# Patient Record
Sex: FEMALE | Race: BLACK OR AFRICAN AMERICAN | NOT HISPANIC OR LATINO | Employment: FULL TIME | URBAN - METROPOLITAN AREA
[De-identification: names, ages, dates, MRNs, and addresses within clinical notes are randomized per-mention and may not be internally consistent; named-entity substitution may affect disease eponyms.]

---

## 2018-03-12 ENCOUNTER — HOSPITAL ENCOUNTER (EMERGENCY)
Facility: OTHER | Age: 43
Discharge: HOME | End: 2018-03-12
Attending: EMERGENCY MEDICINE
Payer: COMMERCIAL

## 2018-03-12 VITALS
HEART RATE: 77 BPM | TEMPERATURE: 98 F | SYSTOLIC BLOOD PRESSURE: 139 MMHG | DIASTOLIC BLOOD PRESSURE: 73 MMHG | WEIGHT: 233 LBS | HEIGHT: 63 IN | RESPIRATION RATE: 16 BRPM | BODY MASS INDEX: 41.29 KG/M2 | OXYGEN SATURATION: 100 %

## 2018-03-12 DIAGNOSIS — T22.211A SECOND DEGREE BURN OF FOREARM, RIGHT, INITIAL ENCOUNTER: Primary | ICD-10-CM

## 2018-03-12 DIAGNOSIS — L03.113 CELLULITIS OF FOREARM, RIGHT: ICD-10-CM

## 2018-03-12 LAB
B-HCG UR QL: NEGATIVE
CTP QC/QA: YES

## 2018-03-12 PROCEDURE — 25000003 PHARM REV CODE 250: Performed by: EMERGENCY MEDICINE

## 2018-03-12 PROCEDURE — 63600175 PHARM REV CODE 636 W HCPCS: Performed by: EMERGENCY MEDICINE

## 2018-03-12 PROCEDURE — 90715 TDAP VACCINE 7 YRS/> IM: CPT | Performed by: EMERGENCY MEDICINE

## 2018-03-12 PROCEDURE — 99283 EMERGENCY DEPT VISIT LOW MDM: CPT | Mod: 25

## 2018-03-12 PROCEDURE — 90471 IMMUNIZATION ADMIN: CPT | Performed by: EMERGENCY MEDICINE

## 2018-03-12 PROCEDURE — 81025 URINE PREGNANCY TEST: CPT | Performed by: PHYSICIAN ASSISTANT

## 2018-03-12 RX ORDER — TRAMADOL HYDROCHLORIDE 50 MG/1
50 TABLET ORAL EVERY 6 HOURS PRN
Qty: 10 TABLET | Refills: 0 | Status: SHIPPED | OUTPATIENT
Start: 2018-03-12 | End: 2018-03-22

## 2018-03-12 RX ORDER — METOPROLOL TARTRATE 25 MG/1
0.5 TABLET, FILM COATED ORAL DAILY
COMMUNITY
End: 2018-06-01

## 2018-03-12 RX ORDER — SULFAMETHOXAZOLE AND TRIMETHOPRIM 800; 160 MG/1; MG/1
2 TABLET ORAL
Status: COMPLETED | OUTPATIENT
Start: 2018-03-12 | End: 2018-03-12

## 2018-03-12 RX ORDER — SULFAMETHOXAZOLE AND TRIMETHOPRIM 800; 160 MG/1; MG/1
2 TABLET ORAL 2 TIMES DAILY
Qty: 28 TABLET | Refills: 0 | Status: SHIPPED | OUTPATIENT
Start: 2018-03-12 | End: 2018-03-19

## 2018-03-12 RX ORDER — SILVER SULFADIAZINE 10 G/1000G
CREAM TOPICAL
Status: COMPLETED | OUTPATIENT
Start: 2018-03-12 | End: 2018-03-12

## 2018-03-12 RX ADMIN — CLOSTRIDIUM TETANI TOXOID ANTIGEN (FORMALDEHYDE INACTIVATED), CORYNEBACTERIUM DIPHTHERIAE TOXOID ANTIGEN (FORMALDEHYDE INACTIVATED), BORDETELLA PERTUSSIS TOXOID ANTIGEN (GLUTARALDEHYDE INACTIVATED), BORDETELLA PERTUSSIS FILAMENTOUS HEMAGGLUTININ ANTIGEN (FORMALDEHYDE INACTIVATED), BORDETELLA PERTUSSIS PERTACTIN ANTIGEN, AND BORDETELLA PERTUSSIS FIMBRIAE 2/3 ANTIGEN 0.5 ML: 5; 2; 2.5; 5; 3; 5 INJECTION, SUSPENSION INTRAMUSCULAR at 03:03

## 2018-03-12 RX ADMIN — SILVER SULFADIAZINE: 10 CREAM TOPICAL at 03:03

## 2018-03-12 RX ADMIN — SULFAMETHOXAZOLE AND TRIMETHOPRIM 2 TABLET: 800; 160 TABLET ORAL at 03:03
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
## 2018-06-01 ENCOUNTER — OFFICE VISIT (OUTPATIENT)
Dept: INTERNAL MEDICINE | Facility: CLINIC | Age: 43
End: 2018-06-01
Payer: COMMERCIAL

## 2018-06-01 ENCOUNTER — LAB VISIT (OUTPATIENT)
Dept: LAB | Facility: HOSPITAL | Age: 43
End: 2018-06-01
Attending: INTERNAL MEDICINE
Payer: COMMERCIAL

## 2018-06-01 VITALS
WEIGHT: 233.69 LBS | RESPIRATION RATE: 19 BRPM | TEMPERATURE: 98 F | HEART RATE: 70 BPM | SYSTOLIC BLOOD PRESSURE: 120 MMHG | DIASTOLIC BLOOD PRESSURE: 80 MMHG | HEIGHT: 63 IN | BODY MASS INDEX: 41.41 KG/M2

## 2018-06-01 DIAGNOSIS — Z00.00 ANNUAL PHYSICAL EXAM: ICD-10-CM

## 2018-06-01 DIAGNOSIS — R53.83 FATIGUE, UNSPECIFIED TYPE: ICD-10-CM

## 2018-06-01 DIAGNOSIS — D50.0 IRON DEFICIENCY ANEMIA DUE TO CHRONIC BLOOD LOSS: ICD-10-CM

## 2018-06-01 DIAGNOSIS — F32.A DEPRESSION, UNSPECIFIED DEPRESSION TYPE: ICD-10-CM

## 2018-06-01 DIAGNOSIS — Z00.00 ANNUAL PHYSICAL EXAM: Primary | ICD-10-CM

## 2018-06-01 LAB
25(OH)D3+25(OH)D2 SERPL-MCNC: 9 NG/ML
ALBUMIN SERPL BCP-MCNC: 3.7 G/DL
ALP SERPL-CCNC: 39 U/L
ALT SERPL W/O P-5'-P-CCNC: 15 U/L
ANION GAP SERPL CALC-SCNC: 6 MMOL/L
ANISOCYTOSIS BLD QL SMEAR: SLIGHT
AST SERPL-CCNC: 16 U/L
BASOPHILS # BLD AUTO: 0.01 K/UL
BASOPHILS NFR BLD: 0.2 %
BILIRUB SERPL-MCNC: 1.9 MG/DL
BUN SERPL-MCNC: 15 MG/DL
CALCIUM SERPL-MCNC: 8.9 MG/DL
CHLORIDE SERPL-SCNC: 112 MMOL/L
CHOLEST SERPL-MCNC: 143 MG/DL
CHOLEST/HDLC SERPL: 2.4 {RATIO}
CO2 SERPL-SCNC: 25 MMOL/L
CREAT SERPL-MCNC: 0.7 MG/DL
DACRYOCYTES BLD QL SMEAR: ABNORMAL
DIFFERENTIAL METHOD: ABNORMAL
EOSINOPHIL # BLD AUTO: 0.1 K/UL
EOSINOPHIL NFR BLD: 1.3 %
ERYTHROCYTE [DISTWIDTH] IN BLOOD BY AUTOMATED COUNT: 17.9 %
EST. GFR  (AFRICAN AMERICAN): >60 ML/MIN/1.73 M^2
EST. GFR  (NON AFRICAN AMERICAN): >60 ML/MIN/1.73 M^2
ESTIMATED AVG GLUCOSE: ABNORMAL MG/DL
FERRITIN SERPL-MCNC: 24 NG/ML
FOLATE SERPL-MCNC: 7.8 NG/ML
GIANT PLATELETS BLD QL SMEAR: PRESENT
GLUCOSE SERPL-MCNC: 93 MG/DL
HBA1C MFR BLD HPLC: <4 %
HCT VFR BLD AUTO: 30.3 %
HDLC SERPL-MCNC: 60 MG/DL
HDLC SERPL: 42 %
HGB BLD-MCNC: 9.8 G/DL
HYPOCHROMIA BLD QL SMEAR: ABNORMAL
IMM GRANULOCYTES # BLD AUTO: 0.03 K/UL
IMM GRANULOCYTES NFR BLD AUTO: 0.7 %
IRON SERPL-MCNC: 60 UG/DL
LDLC SERPL CALC-MCNC: 74.4 MG/DL
LYMPHOCYTES # BLD AUTO: 0.9 K/UL
LYMPHOCYTES NFR BLD: 20.8 %
MCH RBC QN AUTO: 25.1 PG
MCHC RBC AUTO-ENTMCNC: 32.3 G/DL
MCV RBC AUTO: 78 FL
MONOCYTES # BLD AUTO: 0.3 K/UL
MONOCYTES NFR BLD: 6.9 %
NEUTROPHILS # BLD AUTO: 3.2 K/UL
NEUTROPHILS NFR BLD: 70.1 %
NONHDLC SERPL-MCNC: 83 MG/DL
NRBC BLD-RTO: 0 /100 WBC
OVALOCYTES BLD QL SMEAR: ABNORMAL
PLATELET # BLD AUTO: 186 K/UL
PLATELET BLD QL SMEAR: ABNORMAL
PMV BLD AUTO: ABNORMAL FL
POIKILOCYTOSIS BLD QL SMEAR: SLIGHT
POLYCHROMASIA BLD QL SMEAR: ABNORMAL
POTASSIUM SERPL-SCNC: 3.9 MMOL/L
PROT SERPL-MCNC: 7.1 G/DL
RBC # BLD AUTO: 3.91 M/UL
SATURATED IRON: 15 %
SCHISTOCYTES BLD QL SMEAR: ABNORMAL
SCHISTOCYTES BLD QL SMEAR: PRESENT
SODIUM SERPL-SCNC: 143 MMOL/L
T4 FREE SERPL-MCNC: 0.94 NG/DL
TARGETS BLD QL SMEAR: ABNORMAL
TOTAL IRON BINDING CAPACITY: 404 UG/DL
TRANSFERRIN SERPL-MCNC: 273 MG/DL
TRIGL SERPL-MCNC: 43 MG/DL
TSH SERPL DL<=0.005 MIU/L-ACNC: 0.32 UIU/ML
VIT B12 SERPL-MCNC: 599 PG/ML
WBC # BLD AUTO: 4.52 K/UL

## 2018-06-01 PROCEDURE — 80053 COMPREHEN METABOLIC PANEL: CPT

## 2018-06-01 PROCEDURE — 82306 VITAMIN D 25 HYDROXY: CPT

## 2018-06-01 PROCEDURE — 84443 ASSAY THYROID STIM HORMONE: CPT

## 2018-06-01 PROCEDURE — 83540 ASSAY OF IRON: CPT

## 2018-06-01 PROCEDURE — 99386 PREV VISIT NEW AGE 40-64: CPT | Mod: S$GLB,,, | Performed by: INTERNAL MEDICINE

## 2018-06-01 PROCEDURE — 83036 HEMOGLOBIN GLYCOSYLATED A1C: CPT

## 2018-06-01 PROCEDURE — 36415 COLL VENOUS BLD VENIPUNCTURE: CPT | Mod: PO

## 2018-06-01 PROCEDURE — 85025 COMPLETE CBC W/AUTO DIFF WBC: CPT

## 2018-06-01 PROCEDURE — 99999 PR PBB SHADOW E&M-EST. PATIENT-LVL IV: CPT | Mod: PBBFAC,,, | Performed by: INTERNAL MEDICINE

## 2018-06-01 PROCEDURE — 82728 ASSAY OF FERRITIN: CPT

## 2018-06-01 PROCEDURE — 82607 VITAMIN B-12: CPT

## 2018-06-01 PROCEDURE — 82746 ASSAY OF FOLIC ACID SERUM: CPT

## 2018-06-01 PROCEDURE — 84439 ASSAY OF FREE THYROXINE: CPT

## 2018-06-01 PROCEDURE — 80061 LIPID PANEL: CPT

## 2018-06-01 RX ORDER — BUPROPION HYDROCHLORIDE 150 MG/1
150 TABLET ORAL DAILY
Qty: 30 TABLET | Refills: 11 | Status: SHIPPED | OUTPATIENT
Start: 2018-06-01 | End: 2018-08-14 | Stop reason: DRUGHIGH
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
## 2018-06-01 NOTE — PATIENT INSTRUCTIONS
Given the sensitive nature of Psychiatry visits, you will need to call to schedule your own appointment as our referral coordinator can not schedule this for you. The number for that department is [phone].

## 2018-06-04 ENCOUNTER — PATIENT MESSAGE (OUTPATIENT)
Dept: INTERNAL MEDICINE | Facility: CLINIC | Age: 43
End: 2018-06-04

## 2018-06-04 DIAGNOSIS — E05.90 SUBCLINICAL HYPERTHYROIDISM: ICD-10-CM

## 2018-06-04 DIAGNOSIS — F33.0 MILD EPISODE OF RECURRENT MAJOR DEPRESSIVE DISORDER: ICD-10-CM

## 2018-06-04 DIAGNOSIS — D50.9 IRON DEFICIENCY ANEMIA, UNSPECIFIED IRON DEFICIENCY ANEMIA TYPE: ICD-10-CM

## 2018-06-04 DIAGNOSIS — R17 SERUM TOTAL BILIRUBIN ELEVATED: ICD-10-CM

## 2018-06-04 DIAGNOSIS — E55.9 VITAMIN D DEFICIENCY: ICD-10-CM

## 2018-06-04 RX ORDER — ERGOCALCIFEROL 1.25 MG/1
50000 CAPSULE ORAL
Qty: 12 CAPSULE | Refills: 0 | Status: SHIPPED | OUTPATIENT
Start: 2018-06-04 | End: 2018-08-21

## 2018-07-13 ENCOUNTER — OFFICE VISIT (OUTPATIENT)
Dept: OBSTETRICS AND GYNECOLOGY | Facility: CLINIC | Age: 43
End: 2018-07-13
Payer: COMMERCIAL

## 2018-07-13 VITALS
HEIGHT: 63 IN | DIASTOLIC BLOOD PRESSURE: 80 MMHG | WEIGHT: 237.19 LBS | SYSTOLIC BLOOD PRESSURE: 120 MMHG | BODY MASS INDEX: 42.03 KG/M2

## 2018-07-13 DIAGNOSIS — N93.9 ABNORMAL UTERINE BLEEDING: ICD-10-CM

## 2018-07-13 DIAGNOSIS — Z01.419 WELL FEMALE EXAM WITH ROUTINE GYNECOLOGICAL EXAM: Primary | ICD-10-CM

## 2018-07-13 DIAGNOSIS — Z12.39 BREAST CANCER SCREENING: ICD-10-CM

## 2018-07-13 PROCEDURE — 99999 PR PBB SHADOW E&M-EST. PATIENT-LVL III: CPT | Mod: PBBFAC,,, | Performed by: OBSTETRICS & GYNECOLOGY

## 2018-07-13 PROCEDURE — 99386 PREV VISIT NEW AGE 40-64: CPT | Mod: S$GLB,,, | Performed by: OBSTETRICS & GYNECOLOGY

## 2018-07-13 PROCEDURE — 88175 CYTOPATH C/V AUTO FLUID REDO: CPT

## 2018-07-13 RX ORDER — ACETAMINOPHEN AND CODEINE PHOSPHATE 300; 30 MG/1; MG/1
1 TABLET ORAL
Status: ON HOLD | COMMUNITY
End: 2018-09-10 | Stop reason: HOSPADM

## 2018-07-13 RX ORDER — CYCLOBENZAPRINE HCL 10 MG
10 TABLET ORAL 3 TIMES DAILY PRN
COMMUNITY
End: 2021-07-19

## 2018-07-13 RX ORDER — TRAMADOL HYDROCHLORIDE 100 MG/1
100 TABLET, EXTENDED RELEASE ORAL DAILY
COMMUNITY
End: 2021-07-19

## 2018-07-13 NOTE — LETTER
July 13, 2018      Janneth Tavera MD  [address] [facility] [address]           [facility] - OB/ GYN  [address] [facility] [address]  Phone: [phone]          Patient: Rakel Mims   MR Number: [id number]   YOB: 1975   Date of Visit: 7/13/2018       Dear Dr. Janneth Tavera:    Thank you for referring Rakel Mims to me for evaluation. Attached you will find relevant portions of my assessment and plan of care.    If you have questions, please do not hesitate to call me. I look forward to following Rakel Mims along with you.    Sincerely,    Kate Butterfield MD    Enclosure  CC:  No Recipients    If you would like to receive this communication electronically, please contact [email or web address] or [phone] to request more information on [employer] access.    For providers and/or their staff who would like to refer a patient to [facility], please contact us through our one-stop-shop provider referral line, Rosalino Garcia, at [phone].    If you feel you have received this communication in error or would no longer like to receive these types of communications, please e-mail [email or web address]

## 2018-07-13 NOTE — PROGRESS NOTES
SUBJECTIVE:   43 y.o. female   for routine gyn exam. Patient's last menstrual period was 2018..  She reports heavy periods.         Past Medical History:   Diagnosis Date    Bipolar 1 disorder     Hypertension      Past Surgical History:   Procedure Laterality Date    CHOLECYSTECTOMY      TUBAL LIGATION       Social History     Social History    Marital status:      Spouse name: N/A    Number of children: N/A    Years of education: N/A     Occupational History    Not on file.     Social History Main Topics    Smoking status: Never Smoker    Smokeless tobacco: Never Used    Alcohol use Yes      Comment: occasionally    Drug use: No    Sexual activity: Not Currently     Birth control/ protection: See Surgical Hx      Comment: Bilateral Tubal Ligation     Other Topics Concern    Not on file     Social History Narrative    No narrative on file     Family History   Problem Relation Age of Onset    Hypertension Mother     Heart failure Father     Hypertension Father     Ulcers Father     Hypertension Sister     ADD / ADHD Daughter     ADD / ADHD Son     Breast cancer Neg Hx     Colon cancer Neg Hx     Ovarian cancer Neg Hx      OB History    Para Term  AB Living   5 2       2   SAB TAB Ectopic Multiple Live Births           2      # Outcome Date GA Lbr César/2nd Weight Sex Delivery Anes PTL Lv   5             4             3             2 Para            1 Para                     Current Outpatient Prescriptions   Medication Sig Dispense Refill    acetaminophen-codeine 300-30mg (TYLENOL #3) 300-30 mg Tab Take by mouth.      cyclobenzaprine (FLEXERIL) 10 MG tablet Take 10 mg by mouth 3 (three) times daily as needed for Muscle spasms.      traMADol (ULTRAM-ER) 100 MG Tb24 Take 100 mg by mouth once daily.      buPROPion (WELLBUTRIN XL) 150 MG TB24 tablet Take 1 tablet (150 mg total) by mouth once daily. 30 tablet 11    ergocalciferol  "(ERGOCALCIFEROL) 50,000 unit Cap Take 1 capsule (50,000 Units total) by mouth every 7 days. 12 capsule 0     No current facility-administered medications for this visit.      Allergies: Reglan [metoclopramide hcl]     ROS:  Constitutional: no weight loss, weight gain, fever, fatigue  Eyes:  No vision changes, glasses/contacts  ENT/Mouth: No ulcers, sinus problems, ears ringing, headache  Cardiovascular: No inability to lie flat, chest pain, exercise intolerance, swelling, heart palpitations  Respiratory: No wheezing, coughing blood, shortness of breath, or cough  Gastrointestinal: No diarrhea, bloody stool, nausea/vomiting, constipation, gas, hemorrhoids  Genitourinary: No blood in urine, painful urination, urgency of urination, frequency of urination, incomplete emptying, incontinence, abnormal bleeding, painful periods, +heavy periods, vaginal discharge, vaginal odor, painful intercourse, sexual problems, bleeding after intercourse.  Musculoskeletal: No muscle weakness  Skin/Breast: No painful breasts, nipple discharge, masses, rash, ulcers  Neurological: No passing out, seizures, numbness, headache  Endocrine: No diabetes, hypothyroid, hyperthyroid, hot flashes, hair loss, abnormal hair growth, ance  Psychiatric: No depression, crying  Hematologic: No bruises, bleeding, swollen lymph nodes, anemia.      OBJECTIVE:   The patient appears well, alert, oriented x 3, in no distress.  /80 (BP Location: Left arm, Patient Position: Sitting, BP Method: Large (Manual))   Ht 5' 3" (1.6 m)   Wt 107.6 kg (237 lb 3.4 oz)   LMP 06/30/2018   BMI 42.02 kg/m²   NECK: no thyromegaly, trachea midline  SKIN: no acne, striae, hirsutism  CHEST: CTAB  CV: RRR  BREAST EXAM: breasts appear normal, no suspicious masses, no skin or nipple changes or axillary nodes  ABDOMEN: no hernias, masses, or hepatosplenomegaly  GENITALIA: normal external genitalia, no erythema, no discharge  URETHRA: normal urethra, normal urethral " meatus  VAGINA: Normal  CERVIX: no lesions or cervical motion tenderness  UTERUS: normal size, contour, position, consistency, mobility, non-tender  ADNEXA: no mass, fullness, tenderness      ASSESSMENT:   1. Well female exam with routine gynecological exam  Liquid-based pap smear, screening   2. Breast cancer screening  Mammo Digital Screening Bilat with CAD   3. Abnormal uterine bleeding  US Pelvis Comp with Transvag NON-OB (xpd       PLAN:   Orders Placed This Encounter    Mammo Digital Screening Bilat with CAD    US Pelvis Comp with Transvag NON-OB (xpd    Liquid-based pap smear, screening     Discussed heavy periods.  Plan u/s and appt after to discuss  Return to clinic in 1 year

## 2018-07-25 ENCOUNTER — HOSPITAL ENCOUNTER (OUTPATIENT)
Dept: RADIOLOGY | Facility: OTHER | Age: 43
Discharge: HOME | End: 2018-07-25
Attending: OBSTETRICS & GYNECOLOGY
Payer: COMMERCIAL

## 2018-07-25 DIAGNOSIS — N93.9 ABNORMAL UTERINE BLEEDING: ICD-10-CM

## 2018-07-25 PROCEDURE — 76856 US EXAM PELVIC COMPLETE: CPT | Mod: TC

## 2018-07-25 PROCEDURE — 76830 TRANSVAGINAL US NON-OB: CPT | Mod: 26,,, | Performed by: RADIOLOGY

## 2018-07-25 PROCEDURE — 76856 US EXAM PELVIC COMPLETE: CPT | Mod: 26,,, | Performed by: RADIOLOGY

## 2018-07-26 ENCOUNTER — TELEPHONE (OUTPATIENT)
Dept: OBSTETRICS AND GYNECOLOGY | Facility: CLINIC | Age: 43
End: 2018-07-26

## 2018-07-26 ENCOUNTER — PATIENT MESSAGE (OUTPATIENT)
Dept: OBSTETRICS AND GYNECOLOGY | Facility: CLINIC | Age: 43
End: 2018-07-26

## 2018-07-26 NOTE — TELEPHONE ENCOUNTER
----- Message from Uzma Ha sent at 7/26/2018 11:52 AM CDT -----  Contact: REMBERTO SANTORO [[id number]]            Name of Who is Calling: REMBERTO SANTORO [[id number]]      What is the request in detail: Returning a call.    Can the clinic reply by [facility]: no    [facility] [phone]

## 2018-08-13 ENCOUNTER — OFFICE VISIT (OUTPATIENT)
Dept: OBSTETRICS AND GYNECOLOGY | Facility: CLINIC | Age: 43
End: 2018-08-13
Attending: OBSTETRICS & GYNECOLOGY
Payer: COMMERCIAL

## 2018-08-13 ENCOUNTER — TELEPHONE (OUTPATIENT)
Dept: OBSTETRICS AND GYNECOLOGY | Facility: CLINIC | Age: 43
End: 2018-08-13

## 2018-08-13 VITALS
BODY MASS INDEX: 41.68 KG/M2 | SYSTOLIC BLOOD PRESSURE: 128 MMHG | WEIGHT: 235.25 LBS | DIASTOLIC BLOOD PRESSURE: 74 MMHG | HEIGHT: 63 IN

## 2018-08-13 DIAGNOSIS — N93.9 ABNORMAL UTERINE BLEEDING: ICD-10-CM

## 2018-08-13 DIAGNOSIS — N93.9 ABNORMAL UTERINE BLEEDING: Primary | ICD-10-CM

## 2018-08-13 DIAGNOSIS — R93.89 THICKENED ENDOMETRIUM: Primary | ICD-10-CM

## 2018-08-13 DIAGNOSIS — R93.5 ABNORMAL ULTRASOUND OF ENDOMETRIUM: ICD-10-CM

## 2018-08-13 PROCEDURE — 99214 OFFICE O/P EST MOD 30 MIN: CPT | Mod: 57,S$GLB,, | Performed by: OBSTETRICS & GYNECOLOGY

## 2018-08-13 PROCEDURE — 3008F BODY MASS INDEX DOCD: CPT | Mod: CPTII,S$GLB,, | Performed by: OBSTETRICS & GYNECOLOGY

## 2018-08-13 PROCEDURE — 99999 PR PBB SHADOW E&M-EST. PATIENT-LVL III: CPT | Mod: PBBFAC,,, | Performed by: OBSTETRICS & GYNECOLOGY

## 2018-08-13 NOTE — PROGRESS NOTES
SUBJECTIVE:   43 y.o. female   for follow up of AUB. Patient's last menstrual period was 2018 (exact date)..  Seen 18 for [employer].  Reported heavy periods that occur at normal intervals.   H/o BTL.  U/s showed em stripe of 39 mm.  Has anemia.         Past Medical History:   Diagnosis Date    Bipolar 1 disorder     Hypertension      Past Surgical History:   Procedure Laterality Date    CHOLECYSTECTOMY      TUBAL LIGATION       Social History     Socioeconomic History    Marital status:      Spouse name: Not on file    Number of children: Not on file    Years of education: Not on file    Highest education level: Not on file   Social Needs    Financial resource strain: Not on file    Food insecurity - worry: Not on file    Food insecurity - inability: Not on file    Transportation needs - medical: Not on file    Transportation needs - non-medical: Not on file   Occupational History    Not on file   Tobacco Use    Smoking status: Never Smoker    Smokeless tobacco: Never Used   Substance and Sexual Activity    Alcohol use: Yes     Comment: occasionally    Drug use: No    Sexual activity: Not Currently     Birth control/protection: See Surgical Hx     Comment: Bilateral Tubal Ligation   Other Topics Concern    Not on file   Social History Narrative    Not on file     Family History   Problem Relation Age of Onset    Hypertension Mother     Heart failure Father     Hypertension Father     Ulcers Father     Hypertension Sister     ADD / ADHD Daughter     ADD / ADHD Son     Breast cancer Neg Hx     Colon cancer Neg Hx     Ovarian cancer Neg Hx      OB History    Para Term  AB Living   5 2       2   SAB TAB Ectopic Multiple Live Births           2      # Outcome Date GA Lbr César/2nd Weight Sex Delivery Anes PTL Lv   5             4             3             2 Para            1 Para                     Current Outpatient Medications   Medication  "Sig Dispense Refill    acetaminophen-codeine 300-30mg (TYLENOL #3) 300-30 mg Tab Take by mouth.      buPROPion (WELLBUTRIN XL) 150 MG TB24 tablet Take 1 tablet (150 mg total) by mouth once daily. 30 tablet 11    cyclobenzaprine (FLEXERIL) 10 MG tablet Take 10 mg by mouth 3 (three) times daily as needed for Muscle spasms.      ergocalciferol (ERGOCALCIFEROL) 50,000 unit Cap Take 1 capsule (50,000 Units total) by mouth every 7 days. 12 capsule 0    traMADol (ULTRAM-ER) 100 MG Tb24 Take 100 mg by mouth once daily.       No current facility-administered medications for this visit.      Allergies: Reglan [metoclopramide hcl]     ROS:  Constitutional: no weight loss, weight gain, fever, fatigue  Eyes:  No vision changes, glasses/contacts  ENT/Mouth: No ulcers, sinus problems, ears ringing, headache  Cardiovascular: No inability to lie flat, chest pain, exercise intolerance, swelling, heart palpitations  Respiratory: No wheezing, coughing blood, shortness of breath, or cough  Gastrointestinal: No diarrhea, bloody stool, nausea/vomiting, constipation, gas, hemorrhoids  Genitourinary: No blood in urine, painful urination, urgency of urination, frequency of urination, incomplete emptying, incontinence, +abnormal bleeding, painful periods, +heavy periods, vaginal discharge, vaginal odor, painful intercourse, sexual problems, bleeding after intercourse.  Musculoskeletal: No muscle weakness  Skin/Breast: No painful breasts, nipple discharge, masses, rash, ulcers  Neurological: No passing out, seizures, numbness, headache  Endocrine: No diabetes, hypothyroid, hyperthyroid, hot flashes, hair loss, abnormal hair growth, ance  Psychiatric: No depression, crying  Hematologic: No bruises, bleeding, swollen lymph nodes, anemia.      OBJECTIVE:   The patient appears well, alert, oriented x 3, in no distress.  /74 (BP Location: Left arm, Patient Position: Sitting)   Ht 5' 3" (1.6 m)   Wt 106.7 kg (235 lb 3.7 oz)   LMP " 08/02/2018 (Exact Date)   BMI 41.67 kg/m²       ASSESSMENT:   1. Abnormal uterine bleeding     2. Abnormal ultrasound of endometrium         PLAN:       Discussed AUB, causes, u/s, abnl em stripe, and need for bx.  Discussed office Embx vs D&C hysteroscopy and risks/ benefits of each.  Desires D&C hysteroscopy in OR.  Considering hysterectomy to manage AUB.  Return to clinic for pre-op

## 2018-08-14 DIAGNOSIS — F33.0 MILD EPISODE OF RECURRENT MAJOR DEPRESSIVE DISORDER: Primary | ICD-10-CM

## 2018-08-14 DIAGNOSIS — F32.A DEPRESSION, UNSPECIFIED DEPRESSION TYPE: ICD-10-CM

## 2018-08-14 RX ORDER — BUPROPION HYDROCHLORIDE 300 MG/1
300 TABLET ORAL DAILY
Qty: 30 TABLET | Refills: 11 | Status: SHIPPED | OUTPATIENT
Start: 2018-08-14 | End: 2018-10-22

## 2018-08-14 RX ORDER — BUPROPION HYDROCHLORIDE 150 MG/1
150 TABLET ORAL DAILY
Qty: 30 TABLET | Refills: 11 | Status: CANCELLED | OUTPATIENT
Start: 2018-08-14 | End: 2019-08-14

## 2018-08-14 NOTE — TELEPHONE ENCOUNTER
----- Message from Hermelinda Laguerre sent at 8/14/2018  2:11 PM CDT -----  Contact: self/[phone]  Type: Rx    Name of medication(s): buPROPion (WELLBUTRIN XL) 150 MG TB24 tablet    Is this a refill? New rx? Refill     Who prescribed medication?      [facility] [address] [place] [address]    Comments: Pt also states that she wants to know if the dosage bumped up. Please advise.

## 2018-09-05 ENCOUNTER — TELEPHONE (OUTPATIENT)
Dept: OBSTETRICS AND GYNECOLOGY | Facility: CLINIC | Age: 43
End: 2018-09-05

## 2018-09-06 ENCOUNTER — OFFICE VISIT (OUTPATIENT)
Dept: OBSTETRICS AND GYNECOLOGY | Facility: CLINIC | Age: 43
End: 2018-09-06
Attending: OBSTETRICS & GYNECOLOGY
Payer: COMMERCIAL

## 2018-09-06 ENCOUNTER — HOSPITAL ENCOUNTER (OUTPATIENT)
Dept: PREADMISSION TESTING | Facility: OTHER | Age: 43
Discharge: HOME | End: 2018-09-06
Attending: OBSTETRICS & GYNECOLOGY
Payer: COMMERCIAL

## 2018-09-06 ENCOUNTER — ANESTHESIA EVENT (OUTPATIENT)
Dept: SURGERY | Facility: OTHER | Age: 43
End: 2018-09-06
Payer: COMMERCIAL

## 2018-09-06 VITALS
DIASTOLIC BLOOD PRESSURE: 70 MMHG | BODY MASS INDEX: 41.95 KG/M2 | WEIGHT: 236.75 LBS | HEIGHT: 63 IN | SYSTOLIC BLOOD PRESSURE: 128 MMHG

## 2018-09-06 VITALS
DIASTOLIC BLOOD PRESSURE: 87 MMHG | OXYGEN SATURATION: 99 % | BODY MASS INDEX: 41.64 KG/M2 | WEIGHT: 235 LBS | HEIGHT: 63 IN | SYSTOLIC BLOOD PRESSURE: 145 MMHG | TEMPERATURE: 98 F | HEART RATE: 65 BPM

## 2018-09-06 DIAGNOSIS — R93.5 ABNORMAL ULTRASOUND OF ENDOMETRIUM: ICD-10-CM

## 2018-09-06 DIAGNOSIS — D50.0 IRON DEFICIENCY ANEMIA DUE TO CHRONIC BLOOD LOSS: ICD-10-CM

## 2018-09-06 DIAGNOSIS — N93.9 ABNORMAL UTERINE BLEEDING: ICD-10-CM

## 2018-09-06 DIAGNOSIS — Z01.818 PREOPERATIVE EXAM FOR GYNECOLOGIC SURGERY: Primary | ICD-10-CM

## 2018-09-06 LAB
ABO + RH BLD: NORMAL
ANISOCYTOSIS BLD QL SMEAR: SLIGHT
BASOPHILS # BLD AUTO: 0.01 K/UL
BASOPHILS NFR BLD: 0.2 %
BLD GP AB SCN CELLS X3 SERPL QL: NORMAL
DIFFERENTIAL METHOD: ABNORMAL
EOSINOPHIL # BLD AUTO: 0.1 K/UL
EOSINOPHIL NFR BLD: 1.4 %
ERYTHROCYTE [DISTWIDTH] IN BLOOD BY AUTOMATED COUNT: 17.9 %
HCT VFR BLD AUTO: 31.8 %
HGB BLD-MCNC: 10.9 G/DL
HYPOCHROMIA BLD QL SMEAR: ABNORMAL
LYMPHOCYTES # BLD AUTO: 1.3 K/UL
LYMPHOCYTES NFR BLD: 25 %
MCH RBC QN AUTO: 25.6 PG
MCHC RBC AUTO-ENTMCNC: 34.3 G/DL
MCV RBC AUTO: 75 FL
MONOCYTES # BLD AUTO: 0.3 K/UL
MONOCYTES NFR BLD: 5.9 %
NEUTROPHILS # BLD AUTO: 3.4 K/UL
NEUTROPHILS NFR BLD: 67.5 %
OVALOCYTES BLD QL SMEAR: ABNORMAL
PLATELET # BLD AUTO: 127 K/UL
PLATELET BLD QL SMEAR: ABNORMAL
PMV BLD AUTO: ABNORMAL FL
POIKILOCYTOSIS BLD QL SMEAR: SLIGHT
POLYCHROMASIA BLD QL SMEAR: ABNORMAL
RBC # BLD AUTO: 4.25 M/UL
SCHISTOCYTES BLD QL SMEAR: ABNORMAL
SCHISTOCYTES BLD QL SMEAR: PRESENT
SPHEROCYTES BLD QL SMEAR: ABNORMAL
WBC # BLD AUTO: 5.07 K/UL

## 2018-09-06 PROCEDURE — 87660 TRICHOMONAS VAGIN DIR PROBE: CPT

## 2018-09-06 PROCEDURE — 86850 RBC ANTIBODY SCREEN: CPT

## 2018-09-06 PROCEDURE — 85025 COMPLETE CBC W/AUTO DIFF WBC: CPT

## 2018-09-06 PROCEDURE — 99499 UNLISTED E&M SERVICE: CPT | Mod: S$GLB,,, | Performed by: OBSTETRICS & GYNECOLOGY

## 2018-09-06 PROCEDURE — 36415 COLL VENOUS BLD VENIPUNCTURE: CPT

## 2018-09-06 PROCEDURE — 99999 PR PBB SHADOW E&M-EST. PATIENT-LVL III: CPT | Mod: PBBFAC,,, | Performed by: OBSTETRICS & GYNECOLOGY

## 2018-09-06 RX ORDER — SODIUM CHLORIDE, SODIUM LACTATE, POTASSIUM CHLORIDE, CALCIUM CHLORIDE 600; 310; 30; 20 MG/100ML; MG/100ML; MG/100ML; MG/100ML
INJECTION, SOLUTION INTRAVENOUS CONTINUOUS
Status: CANCELLED | OUTPATIENT
Start: 2018-09-06

## 2018-09-06 RX ORDER — NAPROXEN 500 MG/1
500 TABLET ORAL
COMMUNITY
End: 2021-07-19

## 2018-09-06 NOTE — PROGRESS NOTES
SUBJECTIVE:   43 y.o. female   For preoperative gyn exam. Patient's last menstrual period was 2018 (exact date)..  Seen 18 for WWE.  Reported heavy periods lasting 5 days that occur at normal intervals.   H/o BTL.  U/s showed em stripe of 39 mm.  Has anemia.  Desires further evaluation and treatment with hysteroscopy D&C.  She has no unusual complaints          Past Medical History:   Diagnosis Date    Bipolar 1 disorder     Hypertension     Iron deficiency anemia 2018     Past Surgical History:   Procedure Laterality Date    CHOLECYSTECTOMY      TUBAL LIGATION       Social History     Socioeconomic History    Marital status:      Spouse name: Not on file    Number of children: Not on file    Years of education: Not on file    Highest education level: Not on file   Social Needs    Financial resource strain: Not on file    Food insecurity - worry: Not on file    Food insecurity - inability: Not on file    Transportation needs - medical: Not on file    Transportation needs - non-medical: Not on file   Occupational History    Not on file   Tobacco Use    Smoking status: Never Smoker    Smokeless tobacco: Never Used   Substance and Sexual Activity    Alcohol use: Yes     Comment: occasionally    Drug use: No    Sexual activity: Not Currently     Birth control/protection: See Surgical Hx     Comment: Bilateral Tubal Ligation   Other Topics Concern    Not on file   Social History Narrative    Not on file     Family History   Problem Relation Age of Onset    Hypertension Mother     Heart failure Father     Hypertension Father     Ulcers Father     Hypertension Sister     ADD / ADHD Daughter     ADD / ADHD Son     Breast cancer Neg Hx     Colon cancer Neg Hx     Ovarian cancer Neg Hx      OB History    Para Term  AB Living   5 2       2   SAB TAB Ectopic Multiple Live Births           2      # Outcome Date GA Lbr César/2nd Weight Sex Delivery Anes PTL Lv    5             4             3             2 Para            1 Para                     Current Outpatient Medications   Medication Sig Dispense Refill    acetaminophen-codeine 300-30mg (TYLENOL #3) 300-30 mg Tab Take 1 tablet by mouth as needed.       buPROPion (WELLBUTRIN XL) 300 MG 24 hr tablet Take 1 tablet (300 mg total) by mouth once daily. 30 tablet 11    cyclobenzaprine (FLEXERIL) 10 MG tablet Take 10 mg by mouth 3 (three) times daily as needed for Muscle spasms.      traMADol (ULTRAM-ER) 100 MG Tb24 Take 100 mg by mouth once daily.      naproxen (NAPROSYN) 500 MG tablet Take 500 mg by mouth as needed (take as needed during cycle).       No current facility-administered medications for this visit.      Allergies: Reglan [metoclopramide hcl]     ROS:  Constitutional: no weight loss, weight gain, fever, fatigue  Eyes:  No vision changes, glasses/contacts  ENT/Mouth: No ulcers, sinus problems, ears ringing, headache  Cardiovascular: No inability to lie flat, chest pain, exercise intolerance, swelling, heart palpitations  Respiratory: No wheezing, coughing blood, shortness of breath, or cough  Gastrointestinal: No diarrhea, bloody stool, nausea/vomiting, constipation, gas, hemorrhoids  Genitourinary: No blood in urine, painful urination, urgency of urination, frequency of urination, incomplete emptying, incontinence, abnormal bleeding, painful periods, +heavy periods, vaginal discharge, vaginal odor, painful intercourse, sexual problems, bleeding after intercourse.  Musculoskeletal: No muscle weakness  Skin/Breast: No painful breasts, nipple discharge, masses, rash, ulcers  Neurological: No passing out, seizures, numbness, headache  Endocrine: No diabetes, hypothyroid, hyperthyroid, hot flashes, hair loss, abnormal hair growth, ance  Psychiatric: No depression, crying  Hematologic: No bruises, bleeding, swollen lymph nodes, +anemia.      OBJECTIVE:   The patient appears well, alert,  "oriented x 3, in no distress.  /70 (BP Location: Right arm, Patient Position: Sitting, BP Method: Large (Manual))   Ht 5' 3" (1.6 m)   Wt 107.4 kg (236 lb 12.4 oz)   LMP 08/31/2018 (Exact Date)   BMI 41.94 kg/m²   NECK: no thyromegaly, trachea midline  SKIN: no acne, striae, hirsutism  CHEST: CTAB  CV: RRR  BREAST EXAM: Deferred  ABDOMEN: no hernias, masses, or hepatosplenomegaly  GENITALIA: normal external genitalia, no erythema, no discharge  URETHRA: normal urethra, normal urethral meatus  VAGINA: Normal  CERVIX: no lesions or cervical motion tenderness  UTERUS: normal size, contour, position, consistency, mobility, non-tender  ADNEXA: no mass, fullness, tenderness      ASSESSMENT:   1. Preoperative exam for gynecologic surgery     2. Abnormal uterine bleeding  Vaginosis Screen by DNA Probe   3. Abnormal ultrasound of endometrium     4. Iron deficiency anemia due to chronic blood loss         PLAN:   Orders Placed This Encounter    Vaginosis Screen by DNA Probe     Consents signed  Return to clinic for post op check  "

## 2018-09-06 NOTE — ANESTHESIA PREPROCEDURE EVALUATION
09/06/2018  Rakel Mims is a 43 y.o., female.    Anesthesia Evaluation    I have reviewed the Patient Summary Reports.    I have reviewed the Nursing Notes.   I have reviewed the Medications.     Review of Systems  Anesthesia Hx:  No problems with previous Anesthesia  Denies Family Hx of Anesthesia complications.   Denies Personal Hx of Anesthesia complications.   Social:  Non-Smoker    Hematology/Oncology:  Hematology Normal   Oncology Normal     EENT/Dental:EENT/Dental Normal   Cardiovascular:   Exercise tolerance: good Denies Hypertension.     Pulmonary:  Pulmonary Normal    Renal/:  Renal/ Normal     Musculoskeletal:  Musculoskeletal Normal    Neurological:  Neurology Normal    Endocrine:  Endocrine Normal    Dermatological:  Skin Normal    Psych:  Psychiatric Normal   Psychotic Disorder and Bipolar disorder.          Physical Exam  General:  Morbid Obesity    Airway/Jaw/Neck:  Airway Findings: Mouth Opening: Normal Tongue: Normal  General Airway Assessment: Adult  Mallampati: I  TM Distance: Normal, at least 6 cm  Jaw/Neck Findings:  Neck ROM: Normal ROM      Dental:  Dental Findings: In tact             Anesthesia Plan  Type of Anesthesia, risks & benefits discussed:  Anesthesia Type:  general  Patient's Preference:   Intra-op Monitoring Plan: standard ASA monitors  Intra-op Monitoring Plan Comments:   Post Op Pain Control Plan:   Post Op Pain Control Plan Comments:   Induction:   IV  Beta Blocker:         Informed Consent: Patient understands risks and agrees with Anesthesia plan.  Questions answered. Anesthesia consent signed with patient.  ASA Score: 2     Day of Surgery Review of History & Physical:    H&P update referred to the surgeon.         Ready For Surgery From Anesthesia Perspective.

## 2018-09-06 NOTE — DISCHARGE INSTRUCTIONS
PRE-ADMIT TESTING -  [phone]    [address]  [facility]          Your surgery has been scheduled at [facility]. We are pleased to have the opportunity to serve you. For Further Information please call [phone].    On the day of surgery please report to the Information Desk on the 1st floor.    · CONTACT YOUR PHYSICIAN'S OFFICE THE DAY PRIOR TO YOUR SURGERY TO OBTAIN YOUR ARRIVAL TIME.     · The evening before surgery do not eat anything after 9 p.m. ( this includes hard candy, chewing gum and mints).  You may only have GATORADE, POWERADE AND WATER  from 9 p.m. until you leave your home.   DO NOT DRINK ANY LIQUIDS ON THE WAY TO THE HOSPITAL.      SPECIAL MEDICATION INSTRUCTIONS: TAKE medications checked off by the Anesthesiologist on your Medication List.    Angiogram Patients: Take medications as instructed by your physician, including aspirin.     Surgery Patients:    If you take ASPIRIN - Your PHYSICIAN/SURGEON will need to inform you IF/OR when you need to stop taking aspirin prior to your surgery.     Do Not take any medications containing IBUPROFEN.  Do Not Wear any make-up or dark nail polish   (especially eye make-up) to surgery. If you come to surgery with makeup on you will be required to remove the makeup or nail polish.    Do not shave your surgical area at least 5 days prior to your surgery. The surgical prep will be performed at the hospital according to Infection Control regulations.    Leave all valuables at home.   Do Not wear any jewelry or watches, including any metal in body piercings.  Contact Lens must be removed before surgery. Either do not wear the contact lens or bring a case and solution for storage.  Please bring a container for eyeglasses or dentures as required.  Bring any paperwork your physician has provided, such as consent forms,  history and physicals, doctor's orders, etc.   Bring comfortable clothes that are loose fitting to wear upon  discharge. Take into consideration the type of surgery being performed.  Maintain your diet as advised per your physician the day prior to surgery.      Adequate rest the night before surgery is advised.   Park in the Parking lot behind the hospital or in the [facility] across the street from the parking lot. Parking is complimentary.  If you will be discharged the same day as your procedure, please arrange for a responsible adult to drive you home or to accompany you if traveling by taxi.   YOU WILL NOT BE PERMITTED TO DRIVE OR TO LEAVE THE HOSPITAL ALONE AFTER SURGERY.   It is strongly recommended that you arrange for someone to remain with you for the first 24 hrs following your surgery.       Thank you for your cooperation.  The Staff of [facility].        Bathing Instructions                                                                 Please shower the evening before and morning of your procedure with    ANTIBACTERIAL SOAP. ( DIAL, etc )  Concentrate on the surgical area   for at least 3 minutes and rinse completely. Dry off as usual.   Do not use any deodorant, powder, body lotions, perfume, after shave or    cologne.

## 2018-09-10 ENCOUNTER — HOSPITAL ENCOUNTER (OUTPATIENT)
Facility: OTHER | Age: 43
Discharge: HOME | End: 2018-09-10
Attending: OBSTETRICS & GYNECOLOGY | Admitting: OBSTETRICS & GYNECOLOGY
Payer: COMMERCIAL

## 2018-09-10 ENCOUNTER — ANESTHESIA (OUTPATIENT)
Dept: SURGERY | Facility: OTHER | Age: 43
End: 2018-09-10
Payer: COMMERCIAL

## 2018-09-10 VITALS
BODY MASS INDEX: 41.64 KG/M2 | HEART RATE: 55 BPM | DIASTOLIC BLOOD PRESSURE: 64 MMHG | OXYGEN SATURATION: 97 % | HEIGHT: 63 IN | SYSTOLIC BLOOD PRESSURE: 140 MMHG | TEMPERATURE: 98 F | RESPIRATION RATE: 18 BRPM | WEIGHT: 235 LBS

## 2018-09-10 DIAGNOSIS — R93.5 ABNORMAL ULTRASOUND OF ENDOMETRIUM: ICD-10-CM

## 2018-09-10 DIAGNOSIS — N93.9 ABNORMAL UTERINE BLEEDING (AUB): ICD-10-CM

## 2018-09-10 DIAGNOSIS — N93.9 ABNORMAL UTERINE BLEEDING: Primary | ICD-10-CM

## 2018-09-10 DIAGNOSIS — Z98.890 STATUS POST HYSTEROSCOPY: ICD-10-CM

## 2018-09-10 PROBLEM — D50.9 IRON DEFICIENCY ANEMIA: Status: RESOLVED | Noted: 2018-06-04 | Resolved: 2018-09-10

## 2018-09-10 PROBLEM — D50.0 IRON DEFICIENCY ANEMIA DUE TO CHRONIC BLOOD LOSS: Status: ACTIVE | Noted: 2018-06-04

## 2018-09-10 LAB
B-HCG UR QL: NEGATIVE
CTP QC/QA: YES

## 2018-09-10 PROCEDURE — 36000707: Performed by: OBSTETRICS & GYNECOLOGY

## 2018-09-10 PROCEDURE — 88305 TISSUE EXAM BY PATHOLOGIST: CPT | Performed by: PATHOLOGY

## 2018-09-10 PROCEDURE — 63600175 PHARM REV CODE 636 W HCPCS: Performed by: NURSE ANESTHETIST, CERTIFIED REGISTERED

## 2018-09-10 PROCEDURE — 58558 HYSTEROSCOPY BIOPSY: CPT | Mod: ,,, | Performed by: OBSTETRICS & GYNECOLOGY

## 2018-09-10 PROCEDURE — 25000003 PHARM REV CODE 250: Performed by: OBSTETRICS & GYNECOLOGY

## 2018-09-10 PROCEDURE — 37000008 HC ANESTHESIA 1ST 15 MINUTES: Performed by: OBSTETRICS & GYNECOLOGY

## 2018-09-10 PROCEDURE — 36000706: Performed by: OBSTETRICS & GYNECOLOGY

## 2018-09-10 PROCEDURE — 63600175 PHARM REV CODE 636 W HCPCS: Performed by: ANESTHESIOLOGY

## 2018-09-10 PROCEDURE — C1782 MORCELLATOR: HCPCS | Performed by: OBSTETRICS & GYNECOLOGY

## 2018-09-10 PROCEDURE — 81025 URINE PREGNANCY TEST: CPT | Performed by: ANESTHESIOLOGY

## 2018-09-10 PROCEDURE — 71000033 HC RECOVERY, INTIAL HOUR: Performed by: OBSTETRICS & GYNECOLOGY

## 2018-09-10 PROCEDURE — 25000003 PHARM REV CODE 250: Performed by: NURSE ANESTHETIST, CERTIFIED REGISTERED

## 2018-09-10 PROCEDURE — 37000009 HC ANESTHESIA EA ADD 15 MINS: Performed by: OBSTETRICS & GYNECOLOGY

## 2018-09-10 PROCEDURE — 71000015 HC POSTOP RECOV 1ST HR: Performed by: OBSTETRICS & GYNECOLOGY

## 2018-09-10 PROCEDURE — 25000003 PHARM REV CODE 250: Performed by: ANESTHESIOLOGY

## 2018-09-10 PROCEDURE — 71000039 HC RECOVERY, EACH ADD'L HOUR: Performed by: OBSTETRICS & GYNECOLOGY

## 2018-09-10 PROCEDURE — 88305 TISSUE EXAM BY PATHOLOGIST: CPT | Mod: 26,,, | Performed by: PATHOLOGY

## 2018-09-10 PROCEDURE — 71000016 HC POSTOP RECOV ADDL HR: Performed by: OBSTETRICS & GYNECOLOGY

## 2018-09-10 PROCEDURE — 27201423 OPTIME MED/SURG SUP & DEVICES STERILE SUPPLY: Performed by: OBSTETRICS & GYNECOLOGY

## 2018-09-10 RX ORDER — OXYCODONE AND ACETAMINOPHEN 5; 325 MG/1; MG/1
1 TABLET ORAL EVERY 4 HOURS PRN
Qty: 5 TABLET | Refills: 0 | Status: SHIPPED | OUTPATIENT
Start: 2018-09-10 | End: 2019-07-24

## 2018-09-10 RX ORDER — MEPERIDINE HYDROCHLORIDE 50 MG/ML
12.5 INJECTION INTRAMUSCULAR; INTRAVENOUS; SUBCUTANEOUS ONCE AS NEEDED
Status: DISCONTINUED | OUTPATIENT
Start: 2018-09-10 | End: 2018-09-10 | Stop reason: HOSPADM

## 2018-09-10 RX ORDER — FENTANYL CITRATE 50 UG/ML
INJECTION, SOLUTION INTRAMUSCULAR; INTRAVENOUS
Status: DISCONTINUED | OUTPATIENT
Start: 2018-09-10 | End: 2018-09-10

## 2018-09-10 RX ORDER — DEXTROSE MONOHYDRATE AND SODIUM CHLORIDE 5; .45 G/100ML; G/100ML
INJECTION, SOLUTION INTRAVENOUS CONTINUOUS
Status: DISCONTINUED | OUTPATIENT
Start: 2018-09-10 | End: 2021-07-19

## 2018-09-10 RX ORDER — FENTANYL CITRATE 50 UG/ML
25 INJECTION, SOLUTION INTRAMUSCULAR; INTRAVENOUS EVERY 5 MIN PRN
Status: COMPLETED | OUTPATIENT
Start: 2018-09-10 | End: 2018-09-10

## 2018-09-10 RX ORDER — SODIUM CHLORIDE, SODIUM LACTATE, POTASSIUM CHLORIDE, CALCIUM CHLORIDE 600; 310; 30; 20 MG/100ML; MG/100ML; MG/100ML; MG/100ML
INJECTION, SOLUTION INTRAVENOUS CONTINUOUS
Status: DISCONTINUED | OUTPATIENT
Start: 2018-09-10 | End: 2018-09-10 | Stop reason: HOSPADM

## 2018-09-10 RX ORDER — GLYCOPYRROLATE 0.2 MG/ML
INJECTION INTRAMUSCULAR; INTRAVENOUS
Status: DISCONTINUED | OUTPATIENT
Start: 2018-09-10 | End: 2018-09-10

## 2018-09-10 RX ORDER — OXYCODONE HYDROCHLORIDE 5 MG/1
5 TABLET ORAL EVERY 6 HOURS PRN
Status: DISCONTINUED | OUTPATIENT
Start: 2018-09-10 | End: 2018-09-10 | Stop reason: HOSPADM

## 2018-09-10 RX ORDER — KETOROLAC TROMETHAMINE 30 MG/ML
INJECTION, SOLUTION INTRAMUSCULAR; INTRAVENOUS
Status: DISCONTINUED | OUTPATIENT
Start: 2018-09-10 | End: 2018-09-10

## 2018-09-10 RX ORDER — ONDANSETRON 2 MG/ML
4 INJECTION INTRAMUSCULAR; INTRAVENOUS DAILY PRN
Status: DISCONTINUED | OUTPATIENT
Start: 2018-09-10 | End: 2018-09-10 | Stop reason: HOSPADM

## 2018-09-10 RX ORDER — OXYCODONE AND ACETAMINOPHEN 5; 325 MG/1; MG/1
1 TABLET ORAL EVERY 4 HOURS PRN
Status: DISCONTINUED | OUTPATIENT
Start: 2018-09-10 | End: 2018-09-10 | Stop reason: HOSPADM

## 2018-09-10 RX ORDER — HYDROMORPHONE HYDROCHLORIDE 2 MG/ML
0.4 INJECTION, SOLUTION INTRAMUSCULAR; INTRAVENOUS; SUBCUTANEOUS EVERY 5 MIN PRN
Status: DISCONTINUED | OUTPATIENT
Start: 2018-09-10 | End: 2018-09-10 | Stop reason: HOSPADM

## 2018-09-10 RX ORDER — SODIUM CHLORIDE 0.9 % (FLUSH) 0.9 %
3 SYRINGE (ML) INJECTION
Status: DISCONTINUED | OUTPATIENT
Start: 2018-09-10 | End: 2018-09-10 | Stop reason: HOSPADM

## 2018-09-10 RX ORDER — LIDOCAINE HCL/PF 100 MG/5ML
SYRINGE (ML) INTRAVENOUS
Status: DISCONTINUED | OUTPATIENT
Start: 2018-09-10 | End: 2018-09-10

## 2018-09-10 RX ORDER — DEXAMETHASONE SODIUM PHOSPHATE 4 MG/ML
INJECTION, SOLUTION INTRA-ARTICULAR; INTRALESIONAL; INTRAMUSCULAR; INTRAVENOUS; SOFT TISSUE
Status: DISCONTINUED | OUTPATIENT
Start: 2018-09-10 | End: 2018-09-10

## 2018-09-10 RX ORDER — PHENYLEPHRINE HYDROCHLORIDE 10 MG/ML
INJECTION INTRAVENOUS
Status: DISCONTINUED | OUTPATIENT
Start: 2018-09-10 | End: 2018-09-10

## 2018-09-10 RX ORDER — PROPOFOL 10 MG/ML
VIAL (ML) INTRAVENOUS
Status: DISCONTINUED | OUTPATIENT
Start: 2018-09-10 | End: 2018-09-10

## 2018-09-10 RX ORDER — MIDAZOLAM HYDROCHLORIDE 1 MG/ML
INJECTION INTRAMUSCULAR; INTRAVENOUS
Status: DISCONTINUED | OUTPATIENT
Start: 2018-09-10 | End: 2018-09-10

## 2018-09-10 RX ORDER — ROCURONIUM BROMIDE 10 MG/ML
INJECTION, SOLUTION INTRAVENOUS
Status: DISCONTINUED | OUTPATIENT
Start: 2018-09-10 | End: 2018-09-10

## 2018-09-10 RX ORDER — ONDANSETRON 2 MG/ML
INJECTION INTRAMUSCULAR; INTRAVENOUS
Status: DISCONTINUED | OUTPATIENT
Start: 2018-09-10 | End: 2018-09-10

## 2018-09-10 RX ORDER — OXYCODONE HYDROCHLORIDE 5 MG/1
5 TABLET ORAL
Status: DISCONTINUED | OUTPATIENT
Start: 2018-09-10 | End: 2018-09-10

## 2018-09-10 RX ORDER — ACETAMINOPHEN 10 MG/ML
INJECTION, SOLUTION INTRAVENOUS
Status: DISCONTINUED | OUTPATIENT
Start: 2018-09-10 | End: 2018-09-10

## 2018-09-10 RX ORDER — NEOSTIGMINE METHYLSULFATE 1 MG/ML
INJECTION, SOLUTION INTRAVENOUS
Status: DISCONTINUED | OUTPATIENT
Start: 2018-09-10 | End: 2018-09-10

## 2018-09-10 RX ADMIN — KETOROLAC TROMETHAMINE 30 MG: 30 INJECTION, SOLUTION INTRAMUSCULAR; INTRAVENOUS at 03:09

## 2018-09-10 RX ADMIN — PROPOFOL 180 MG: 10 INJECTION, EMULSION INTRAVENOUS at 02:09

## 2018-09-10 RX ADMIN — FENTANYL CITRATE 25 MCG: 50 INJECTION, SOLUTION INTRAMUSCULAR; INTRAVENOUS at 03:09

## 2018-09-10 RX ADMIN — OXYCODONE HYDROCHLORIDE 5 MG: 5 TABLET ORAL at 04:09

## 2018-09-10 RX ADMIN — NEOSTIGMINE METHYLSULFATE 5 MG: 1 INJECTION INTRAVENOUS at 03:09

## 2018-09-10 RX ADMIN — DEXAMETHASONE SODIUM PHOSPHATE 8 MG: 4 INJECTION, SOLUTION INTRAMUSCULAR; INTRAVENOUS at 02:09

## 2018-09-10 RX ADMIN — ROCURONIUM BROMIDE 20 MG: 10 INJECTION, SOLUTION INTRAVENOUS at 02:09

## 2018-09-10 RX ADMIN — MIDAZOLAM HYDROCHLORIDE 2 MG: 1 INJECTION, SOLUTION INTRAMUSCULAR; INTRAVENOUS at 01:09

## 2018-09-10 RX ADMIN — FENTANYL CITRATE 100 MCG: 50 INJECTION, SOLUTION INTRAMUSCULAR; INTRAVENOUS at 02:09

## 2018-09-10 RX ADMIN — FENTANYL CITRATE 25 MCG: 50 INJECTION, SOLUTION INTRAMUSCULAR; INTRAVENOUS at 04:09

## 2018-09-10 RX ADMIN — SODIUM CHLORIDE, SODIUM LACTATE, POTASSIUM CHLORIDE, AND CALCIUM CHLORIDE: 600; 310; 30; 20 INJECTION, SOLUTION INTRAVENOUS at 01:09

## 2018-09-10 RX ADMIN — LIDOCAINE HYDROCHLORIDE 100 MG: 20 INJECTION, SOLUTION INTRAVENOUS at 02:09

## 2018-09-10 RX ADMIN — GLYCOPYRROLATE 0.6 MG: 0.2 INJECTION, SOLUTION INTRAMUSCULAR; INTRAVENOUS at 03:09

## 2018-09-10 RX ADMIN — PHENYLEPHRINE HYDROCHLORIDE 100 MCG: 10 INJECTION INTRAVENOUS at 02:09

## 2018-09-10 RX ADMIN — ONDANSETRON 4 MG: 2 INJECTION, SOLUTION INTRAMUSCULAR; INTRAVENOUS at 05:09

## 2018-09-10 RX ADMIN — ONDANSETRON 4 MG: 2 INJECTION INTRAMUSCULAR; INTRAVENOUS at 02:09

## 2018-09-10 RX ADMIN — ACETAMINOPHEN 1000 MG: 10 INJECTION, SOLUTION INTRAVENOUS at 02:09

## 2018-09-10 NOTE — TRANSFER OF CARE
"Anesthesia Transfer of Care Note    Patient: Rakel Mims    Procedure(s) Performed: Procedure(s) (LRB):  HYSTEROSCOPY, WITH DILATION AND CURETTAGE OF UTERUS (N/A)  DILATION AND CURETTAGE, UTERUS  ENDOMETRIUM, USING RESECTOSCOPE    Patient location: PACU    Anesthesia Type: general    Transport from OR: Transported from OR on 2-3 L/min O2 by NC with adequate spontaneous ventilation    Post pain: adequate analgesia    Post assessment: no apparent anesthetic complications and tolerated procedure well    Post vital signs: stable    Level of consciousness: awake, alert and oriented    Nausea/Vomiting: no nausea/vomiting    Complications: none    Transfer of care protocol was followed      Last vitals:   Visit Vitals  /81 (BP Location: Right arm, Patient Position: Lying)   Pulse 64   Temp 36.6 °C (97.9 °F) (Oral)   Resp 18   Ht 5' 3" (1.6 m)   Wt 106.6 kg (234 lb 16 oz)   LMP 08/31/2018 (Exact Date)   SpO2 100%   Breastfeeding? No   BMI 41.63 kg/m²     "

## 2018-09-10 NOTE — ANESTHESIA POSTPROCEDURE EVALUATION
"Anesthesia Post Evaluation    Patient: Rakel Mims    Procedure(s) Performed: Procedure(s) (LRB):  HYSTEROSCOPY, WITH DILATION AND CURETTAGE OF UTERUS (N/A)  DILATION AND CURETTAGE, UTERUS  ENDOMETRIUM, USING RESECTOSCOPE    Final Anesthesia Type: general  Patient location during evaluation: PACU  Patient participation: Yes- Able to Participate  Level of consciousness: oriented and awake  Post-procedure vital signs: reviewed and stable  Pain management: adequate  Airway patency: patent  PONV status at discharge: No PONV  Anesthetic complications: no      Cardiovascular status: hemodynamically stable  Respiratory status: unassisted, spontaneous ventilation and room air  Hydration status: euvolemic  Follow-up not needed.        Visit Vitals  BP (!) 148/72 (BP Location: Right arm, Patient Position: Lying)   Pulse (!) 57   Temp 36.8 °C (98.3 °F) (Oral)   Resp 18   Ht 5' 3" (1.6 m)   Wt 106.6 kg (234 lb 16 oz)   LMP 08/31/2018 (Exact Date)   SpO2 95%   Breastfeeding? No   BMI 41.63 kg/m²       Pain/Telly Score: Pain Assessment Performed: Yes (9/10/2018  4:43 PM)  Presence of Pain: denies (9/10/2018  4:43 PM)  Pain Rating Prior to Med Admin: 4 (9/10/2018  4:23 PM)  Telly Score: 7 (9/10/2018  3:20 PM)        "

## 2018-09-10 NOTE — DISCHARGE INSTRUCTIONS
Home Care Instruction D&C Hysteroscopy             ACTIVITY LEVEL:  If you received sedation and/or an anesthetic, you may feel sleepy for several hours. Rest until you feel more  awake. Gradually resume your normal activities.    DIET:  At home, continue with liquids. If there is no nausea, you may eat a soft diet and gradually resume a regular diet.    BATHING:  You may shower  as desired in one day.  You should avoid tub baths, hot tubs and swimming pools until seen by your physician for a post-op follow up.    CARE:  You can expect watery or bloody vaginal discharge for several days. Do not use tampons, please only use pads. Sexual activity is restricted as advised by your doctor.    MEDICATIONS:  You will receive instructions for any pain and/or antibiotic prescriptions. Pain medication should be taken only if needed and as directed. Antibiotics, if ordered, should be taken as directed until the entire prescription is completed.    ADDITIONAL INFORMATION:  __________________________________________________________________________________________  WHEN TO CALL THE DOCTOR:   For any heavy vaginal bleeding   Fever over 101°F (38.4°C)   Any lower abdominal pain not relieved by the pain mediation  RETURN APPOINTMENT:  __________________________________________________________________________________________  FOR EMERGENCIES:  If any unusual problems or difficulties occur, contact  __________________ or the resident at [phone] (page ) or the Clinic office, [phone].  Anesthesia: After Your Surgery  Youve just had surgery. During surgery, you received medication called anesthesia to keep you comfortable and pain-free. After surgery, you may experience some pain or nausea. This is common. Here are some tips for feeling better and recovering after surgery.    Going home  Your doctor or nurse will show you how to take care of yourself when you go home. He or she will also answer your  questions. Have an adult family member or friend drive you home. For the first 24 hours after your surgery:  · Do not drive or use heavy equipment.  · Do not make important decisions or sign legal documents.  · Avoid alcohol.  · Have someone stay with you, if needed. He or she can watch for problems and help keep you safe.  Be sure to keep all follow-up appointments with your doctor. And rest after your procedure for as long as your doctor tells you to.    Coping with pain  If you have pain after surgery, pain medication will help you feel better. Take it as directed, before pain becomes severe. Also, ask your doctor or pharmacist about other ways to control pain, such as with heat, ice, and relaxation. And follow any other instructions your surgeon or nurse gives you.    Tips for taking pain medication  To get the best relief possible, remember these points:  · Pain medications can upset your stomach. Taking them with a little food may help.  · Most pain relievers taken by mouth need at least 20 to 30 minutes to take effect.  · Taking medication on a schedule can help you remember to take it. Try to time your medication so that you can take it before beginning an activity, such as dressing, walking, or sitting down for dinner.  · Constipation is a common side effect of pain medications. Contact your doctor before taking any medications like laxatives or stool softeners to help relieve constipation. Also ask about any dietary restrictions, because drinking lots of fluids and eating foods like fruits and vegetables that are high in fiber can also help. Remember, dont take laxatives unless your surgeon has prescribed them.  · Mixing alcohol and pain medication can cause dizziness and slow your breathing. It can even be fatal. Dont drink alcohol while taking pain medication.  · Pain medication can slow your reflexes. Dont drive or operate machinery while taking pain medication.  If your health care provider tells  you to take acetaminophen to help relieve your pain, ask him or her how much you are supposed to take each day. (Acetaminophen is the generic name for Tylenol and other brand-name pain relievers.) Acetaminophen or other pain relievers may interact with your prescription medicines or other over-the-counter (OTC) drugs. Some prescription medications contain acetaminophen along with other active ingredients. Using both prescription and OTC acetaminophen for pain can cause you to overdose. The FDA recommends that you read the labels on your OTC medications carefully. This will help you to clearly understand the list of active ingredients, dosing instructions, and any warnings. It may also help you avoid taking too much acetaminophen. If you have questions or don't understand the information, ask your pharmacist or health care provider to explain it to you before you take the OTC medication.    Managing nausea  Some people have an upset stomach after surgery. This is often due to anesthesia, pain, pain medications, or the stress of surgery. The following tips will help you manage nausea and get good nutrition as you recover. If you were on a special diet before surgery, ask your doctor if you should follow it during recovery. These tips may help:  · Dont push yourself to eat. Your body will tell you when to eat and how much.  · Start off with clear liquids and soup. They are easier to digest.  · Progress to semi-solid foods (mashed potatoes, applesauce, and gelatin) as you feel ready.  · Slowly move to solid foods. Dont eat fatty, rich, or spicy foods at first.  · Dont force yourself to have three large meals a day. Instead, eat smaller amounts more often.  · Take pain medications with a small amount of solid food, such as crackers or toast to avoid nausea.      Call your surgeon if    · You feel too sleepy, dizzy, or groggy (medication may be too strong).  · You have side effects like nausea, vomiting, or skin  changes (rash, itching, or hives).   © 8484-4469 [employer] [address] [place] [address], [place] [address]. All rights reserved. This information is not intended as a substitute for professional medical care. Always follow your healthcare professional's instructions.

## 2018-09-10 NOTE — INTERVAL H&P NOTE
The patient has been examined and the H&P has been reviewed:    I concur with the findings and no changes have occurred since H&P was written.    Anesthesia/Surgery risks, benefits and alternative options discussed and understood by patient/family.    Azra Sullivan MD  OB/GYN  PGY-1            Active Hospital Problems    Diagnosis  POA    *Abnormal uterine bleeding [N93.9]  Yes    Abnormal uterine bleeding (AUB) [N93.9]  Yes    Abnormal ultrasound of endometrium [R93.5]  Yes      Resolved Hospital Problems   No resolved problems to display.

## 2018-09-10 NOTE — DISCHARGE SUMMARY
[facility]  Brief Operative Note     SUMMARY     Surgery Date: 9/10/2018     Surgeon(s) and Role:     * Kate Butterfield MD - Primary    Assisting Surgeon: None    Pre-op Diagnosis:  Thickened endometrium [R93.8]  Abnormal uterine bleeding [N93.9]    Post-op Diagnosis:  Post-Op Diagnosis Codes:     * Thickened endometrium [R93.8]     * Abnormal uterine bleeding [N93.9]    Procedure(s) (LRB):  HYSTEROSCOPY, WITH DILATION AND CURETTAGE OF UTERUS (N/A)  DILATION AND CURETTAGE, UTERUS  ENDOMETRIUM, USING RESECTOSCOPE    Anesthesia: General    Description of the findings of the procedure:  1. Normal external female genitalia  2. Normal uterus, sounding to 9 cm, anteverted, mobile  3. Single tooth tenaculum used to retract cervix to visualize external os  4. Patrick dilators used to dilate cervix to 5 mm   5. 5 mm hysteroscope was inserted, Denice clamps used to maintain pressure for hysteroscope  6. Uterine cavity with with polypoid endometrium, bilateral ostia visualized  7. Anterior endometrial polyp resected   8. Tenaculum removed, hemostasis noted  9. Patient in stable condition, moved to PACU    Estimated Blood Loss: none         Specimens:   Specimen (12h ago, onward)    Start     Ordered    09/10/18 1455  Specimen to Pathology - Surgery  Once     Comments:  1.  Endometrial contents     Start Status   09/10/18 1455 Collected (09/10/18 1504)       09/10/18 1504          Discharge Note    SUMMARY     Admit Date: 9/10/2018    Discharge Date and Time: 9/10/2018  5:50 PM    Hospital Course (synopsis of major diagnoses, care, treatment, and services provided during the course of the hospital stay):     Final Diagnosis: Post-Op Diagnosis Codes:     * Thickened endometrium [R93.8]     * Abnormal uterine bleeding [N93.9]    Disposition: Home or Self Care    Follow Up/Patient Instructions:     Medications:  Reconciled Home Medications:      Medication List      START taking these medications     oxyCODONE-acetaminophen 5-325 mg per tablet  Commonly known as:  PERCOCET  Take 1 tablet by mouth every 4 (four) hours as needed for Pain.        CONTINUE taking these medications    buPROPion 300 MG 24 hr tablet  Commonly known as:  WELLBUTRIN XL  Take 1 tablet (300 mg total) by mouth once daily.     cyclobenzaprine 10 MG tablet  Commonly known as:  FLEXERIL  Take 10 mg by mouth 3 (three) times daily as needed for Muscle spasms.     naproxen 500 MG tablet  Commonly known as:  NAPROSYN  Take 500 mg by mouth as needed (take as needed during cycle).     traMADol 100 MG Tb24  Commonly known as:  ULTRAM-ER  Take 100 mg by mouth once daily.        STOP taking these medications    acetaminophen-codeine 300-30mg 300-30 mg Tab  Commonly known as:  TYLENOL #3          Discharge Procedure Orders   Diet Adult Regular     Notify your health care provider if you experience any of the following:  temperature >100.4     Notify your health care provider if you experience any of the following:  persistent nausea and vomiting or diarrhea     Notify your health care provider if you experience any of the following:  severe uncontrolled pain     Notify your health care provider if you experience any of the following:  difficulty breathing or increased cough     Notify your health care provider if you experience any of the following:  severe persistent headache     Notify your health care provider if you experience any of the following:  persistent dizziness, light-headedness, or visual disturbances     Notify your health care provider if you experience any of the following:  increased confusion or weakness     Notify your health care provider if you experience any of the following:   Order Comments: Heavy vaginal bleeding >1 pad/hour for greater over 2 consecutive hours     Activity as tolerated     Follow-up Information     Kate Butterfield MD In 4 weeks.    Specialties:  Obstetrics, Obstetrics and Gynecology  Why:  Postpartum  visit  Contact information:  [address] [place] [id number]  [phone]                 Azra Sullivan MD  OB/GYN  PGY-1

## 2018-09-10 NOTE — H&P
SUBJECTIVE:   43 y.o. female   For preoperative gyn exam. Patient's last menstrual period was 2018 (exact date)..  Seen 18 for [employer].  Reported heavy periods lasting 5 days that occur at normal intervals.   H/o BTL.  U/s showed em stripe of 39 mm.  Has anemia.  Desires further evaluation and treatment with hysteroscopy D&C.  She has no unusual complaints              Past Medical History:   Diagnosis Date    Bipolar 1 disorder      Hypertension      Iron deficiency anemia 2018            Past Surgical History:   Procedure Laterality Date    CHOLECYSTECTOMY        TUBAL LIGATION          Social History            Socioeconomic History    Marital status:        Spouse name: Not on file    Number of children: Not on file    Years of education: Not on file    Highest education level: Not on file   Social Needs    Financial resource strain: Not on file    Food insecurity - worry: Not on file    Food insecurity - inability: Not on file    Transportation needs - medical: Not on file    Transportation needs - non-medical: Not on file   Occupational History    Not on file   Tobacco Use    Smoking status: Never Smoker    Smokeless tobacco: Never Used   Substance and Sexual Activity    Alcohol use: Yes       Comment: occasionally    Drug use: No    Sexual activity: Not Currently       Birth control/protection: See Surgical Hx       Comment: Bilateral Tubal Ligation   Other Topics Concern    Not on file   Social History Narrative    Not on file            Family History   Problem Relation Age of Onset    Hypertension Mother      Heart failure Father      Hypertension Father      Ulcers Father      Hypertension Sister      ADD / ADHD Daughter      ADD / ADHD Son      Breast cancer Neg Hx      Colon cancer Neg Hx      Ovarian cancer Neg Hx                         OB History    Para Term  AB Living   5 2       2   SAB TAB Ectopic Multiple Live Births           2        # Outcome Date GA Lbr César/2nd Weight Sex Delivery Anes PTL Lv   5                      4                      3                      2 Para                     1 Para                                         Current Outpatient Medications   Medication Sig Dispense Refill    acetaminophen-codeine 300-30mg (TYLENOL #3) 300-30 mg Tab Take 1 tablet by mouth as needed.         buPROPion (WELLBUTRIN XL) 300 MG 24 hr tablet Take 1 tablet (300 mg total) by mouth once daily. 30 tablet 11    cyclobenzaprine (FLEXERIL) 10 MG tablet Take 10 mg by mouth 3 (three) times daily as needed for Muscle spasms.        traMADol (ULTRAM-ER) 100 MG Tb24 Take 100 mg by mouth once daily.        naproxen (NAPROSYN) 500 MG tablet Take 500 mg by mouth as needed (take as needed during cycle).          No current facility-administered medications for this visit.       Allergies: Reglan [metoclopramide hcl]      ROS:  Constitutional: no weight loss, weight gain, fever, fatigue  Eyes:  No vision changes, glasses/contacts  ENT/Mouth: No ulcers, sinus problems, ears ringing, headache  Cardiovascular: No inability to lie flat, chest pain, exercise intolerance, swelling, heart palpitations  Respiratory: No wheezing, coughing blood, shortness of breath, or cough  Gastrointestinal: No diarrhea, bloody stool, nausea/vomiting, constipation, gas, hemorrhoids  Genitourinary: No blood in urine, painful urination, urgency of urination, frequency of urination, incomplete emptying, incontinence, abnormal bleeding, painful periods, +heavy periods, vaginal discharge, vaginal odor, painful intercourse, sexual problems, bleeding after intercourse.  Musculoskeletal: No muscle weakness  Skin/Breast: No painful breasts, nipple discharge, masses, rash, ulcers  Neurological: No passing out, seizures, numbness, headache  Endocrine: No diabetes, hypothyroid, hyperthyroid, hot flashes, hair loss, abnormal hair growth, ance  Psychiatric: No  "depression, crying  Hematologic: No bruises, bleeding, swollen lymph nodes, +anemia.        OBJECTIVE:   The patient appears well, alert, oriented x 3, in no distress.  /70 (BP Location: Right arm, Patient Position: Sitting, BP Method: Large (Manual))   Ht 5' 3" (1.6 m)   Wt 107.4 kg (236 lb 12.4 oz)   LMP 08/31/2018 (Exact Date)   BMI 41.94 kg/m²   NECK: no thyromegaly, trachea midline  SKIN: no acne, striae, hirsutism  CHEST: CTAB  CV: RRR  BREAST EXAM: Deferred  ABDOMEN: no hernias, masses, or hepatosplenomegaly  GENITALIA: normal external genitalia, no erythema, no discharge  URETHRA: normal urethra, normal urethral meatus  VAGINA: Normal  CERVIX: no lesions or cervical motion tenderness  UTERUS: normal size, contour, position, consistency, mobility, non-tender  ADNEXA: no mass, fullness, tenderness        ASSESSMENT:   1. Preoperative exam for gynecologic surgery      2. Abnormal uterine bleeding  Vaginosis Screen by DNA Probe   3. Abnormal ultrasound of endometrium      4. Iron deficiency anemia due to chronic blood loss            PLAN:       Orders Placed This Encounter    Vaginosis Screen by DNA Probe      Consents signed  Return to clinic for post op check        "

## 2018-09-10 NOTE — OP NOTE
OPERATIVE REPORT    PREOPERATIVE DIAGNOSIS  1. Abnormal uterine bleeding [N93.9]  2. Abnormal endometrium on ultrasound    POSTOPERATIVE DIAGNOSIS  1. same    PROCEDURE:  1. Hysteroscopy  2. Polypectomy     SURGEON: Ktae Butterfield MD - Primary    ASSISTANT: Azra Sullivan MD (PGY-1)    ANESTHESIA: General    COMPLICATIONS: None    EBL: none    IV FLUIDS: 400 cc    URINE OUTPUT: 50  Cc    Hysteroscopy fluid deficit: 240 cc NS    FINDINGS: Uterus sounded to 9 cm.    PROCEDURE: Patient was taken to the operating room where general anesthesia was administered and found to be adequate.  She was prepped and draped in the dorsal lithotomy position.  A weighted sterile speculum was placed in the vagina.  The anterior lip of the cervix was grasped with a single tooth tenaculum.  The uterus sounded to 9 cm, and the cervix was dilated to 5 mm Hegar dilators.  The 5 mm hysteroscope was advanced through the cervical os and the uterus was distended with normal saline.  The endometrium was inspected and was found to be polypoid throughout. The hysteroscope was removed.  Currette was used to perform a sharp currettage on the uterine lining.  Then the hysteroscope was inserted with the myosure to resect the polypoid tissue with success.  The tubal ostia were identified without difficulty, and appeared normal. The hysteroscope was withdrawn without difficulty.     The endometrial contents were sent to pathology. The tenaculum was removed and hemostasis was noted at the puncture sites in the cervix.     Sponge, lap, needle counts were correct x 2. The patient was taken to the recovery room in stable condition.    Azra Sullivan MD  OB/GYN  PGY-1

## 2018-09-10 NOTE — PLAN OF CARE
Rakel Domingo Prasanna has met all discharge criteria from Phase II. Vital Signs are stable, ambulating  without difficulty. Discharge instructions given, patient verbalized understanding. Discharged from facility via wheelchair in stable condition.

## 2018-09-24 ENCOUNTER — OFFICE VISIT (OUTPATIENT)
Dept: OBSTETRICS AND GYNECOLOGY | Facility: CLINIC | Age: 43
End: 2018-09-24
Attending: OBSTETRICS & GYNECOLOGY
Payer: COMMERCIAL

## 2018-09-24 VITALS
BODY MASS INDEX: 38.89 KG/M2 | WEIGHT: 233.44 LBS | DIASTOLIC BLOOD PRESSURE: 80 MMHG | HEIGHT: 65 IN | SYSTOLIC BLOOD PRESSURE: 130 MMHG

## 2018-09-24 DIAGNOSIS — N93.9 ABNORMAL UTERINE BLEEDING (AUB): Primary | ICD-10-CM

## 2018-09-24 PROBLEM — R93.5 ABNORMAL ULTRASOUND OF ENDOMETRIUM: Status: RESOLVED | Noted: 2018-08-13 | Resolved: 2018-09-24

## 2018-09-24 PROBLEM — Z98.890 STATUS POST HYSTEROSCOPY: Status: RESOLVED | Noted: 2018-09-10 | Resolved: 2018-09-24

## 2018-09-24 PROCEDURE — 99213 OFFICE O/P EST LOW 20 MIN: CPT | Mod: S$GLB,,, | Performed by: OBSTETRICS & GYNECOLOGY

## 2018-09-24 PROCEDURE — 99999 PR PBB SHADOW E&M-EST. PATIENT-LVL III: CPT | Mod: PBBFAC,,, | Performed by: OBSTETRICS & GYNECOLOGY

## 2018-09-24 PROCEDURE — 3008F BODY MASS INDEX DOCD: CPT | Mod: CPTII,S$GLB,, | Performed by: OBSTETRICS & GYNECOLOGY

## 2018-09-24 NOTE — PROGRESS NOTES
SUBJECTIVE:   43 y.o. female   for AUB. Patient's last menstrual period was 2018 (exact date)..  Has monthly heavy periods.  Hysteroscopy  showed polypoid tissue.  Path- benign proliferative, suggestive of adenomyosis.  No complaints today.         Past Medical History:   Diagnosis Date    Bipolar 1 disorder     Hypertension     Iron deficiency anemia 2018     Past Surgical History:   Procedure Laterality Date    ABLATION OF ENDOMETRIUM USING RESECTOSCOPE  9/10/2018    CHOLECYSTECTOMY      DILATION AND CURETTAGE OF UTERUS  9/10/2018    DILATION AND CURETTAGE, UTERUS  9/10/2018    [facility]    ENDOMETRIUM, USING RESECTOSCOPE  9/10/2018    [facility]    HYSTEROSCOPY WITH DILATION AND CURETTAGE OF UTERUS N/A 9/10/2018    HYSTEROSCOPY, WITH DILATION AND CURETTAGE OF UTERUS N/A 9/10/2018    [facility]    TUBAL LIGATION       Social History     Socioeconomic History    Marital status:      Spouse name: Not on file    Number of children: Not on file    Years of education: Not on file    Highest education level: Not on file   Social Needs    Financial resource strain: Not on file    Food insecurity - worry: Not on file    Food insecurity - inability: Not on file    Transportation needs - medical: Not on file    Transportation needs - non-medical: Not on file   Occupational History    Not on file   Tobacco Use    Smoking status: Never Smoker    Smokeless tobacco: Never Used   Substance and Sexual Activity    Alcohol use: Yes     Comment: occasionally    Drug use: No    Sexual activity: Not Currently     Birth control/protection: See Surgical Hx     Comment: Bilateral Tubal Ligation   Other Topics Concern    Not on file   Social History Narrative    Not on file     Family History   Problem Relation Age of Onset    Hypertension Mother     Heart failure  Father     Hypertension Father     Ulcers Father     Hypertension Sister     ADD / ADHD Daughter     ADD / ADHD Son     Breast cancer Neg Hx     Colon cancer Neg Hx     Ovarian cancer Neg Hx      OB History    Para Term  AB Living   5 2       2   SAB TAB Ectopic Multiple Live Births           2      # Outcome Date GA Lbr César/2nd Weight Sex Delivery Anes PTL Lv   5             4             3             2 Para            1 Para                     Current Outpatient Medications   Medication Sig Dispense Refill    buPROPion (WELLBUTRIN XL) 300 MG 24 hr tablet Take 1 tablet (300 mg total) by mouth once daily. 30 tablet 11    cyclobenzaprine (FLEXERIL) 10 MG tablet Take 10 mg by mouth 3 (three) times daily as needed for Muscle spasms.      naproxen (NAPROSYN) 500 MG tablet Take 500 mg by mouth as needed (take as needed during cycle).      oxyCODONE-acetaminophen (PERCOCET) 5-325 mg per tablet Take 1 tablet by mouth every 4 (four) hours as needed for Pain. 5 tablet 0    traMADol (ULTRAM-ER) 100 MG Tb24 Take 100 mg by mouth once daily.       No current facility-administered medications for this visit.      Facility-Administered Medications Ordered in Other Visits   Medication Dose Route Frequency Provider Last Rate Last Dose    dextrose 5 % and 0.45 % NaCl infusion   Intravenous Continuous Kate Butterfield MD         Allergies: Reglan [metoclopramide hcl]     ROS:  Constitutional: no weight loss, weight gain, fever, fatigue  Eyes:  No vision changes, glasses/contacts  ENT/Mouth: No ulcers, sinus problems, ears ringing, headache  Cardiovascular: No inability to lie flat, chest pain, exercise intolerance, swelling, heart palpitations  Respiratory: No wheezing, coughing blood, shortness of breath, or cough  Gastrointestinal: No diarrhea, bloody stool, nausea/vomiting, constipation, gas, hemorrhoids  Genitourinary: No blood in urine, painful urination, urgency of  "urination, frequency of urination, incomplete emptying, incontinence, +abnormal bleeding, painful periods, +heavy periods, vaginal discharge, vaginal odor, painful intercourse, sexual problems, bleeding after intercourse.  Musculoskeletal: No muscle weakness  Skin/Breast: No painful breasts, nipple discharge, masses, rash, ulcers  Neurological: No passing out, seizures, numbness, headache  Endocrine: No diabetes, hypothyroid, hyperthyroid, hot flashes, hair loss, abnormal hair growth, ance  Psychiatric: No depression, crying  Hematologic: No bruises, bleeding, swollen lymph nodes, anemia.      OBJECTIVE:   The patient appears well, alert, oriented x 3, in no distress.  /80 (BP Location: Right arm, Patient Position: Sitting, BP Method: Large (Manual))   Ht 5' 5" (1.651 m)   Wt 105.9 kg (233 lb 7.5 oz)   LMP 08/31/2018 (Exact Date)   BMI 38.85 kg/m²   ABDOMEN: no hernias, masses, or hepatosplenomegaly  GENITALIA: normal external genitalia, no erythema, no discharge  URETHRA: normal urethra, normal urethral meatus  VAGINA: Normal  CERVIX: no lesions or cervical motion tenderness  UTERUS: normal size, contour, position, consistency, mobility, non-tender  ADNEXA: no mass, fullness, tenderness      ASSESSMENT:   1. Abnormal uterine bleeding (AUB)         PLAN:       Discussed surgery, path, etc.  Consider ablation or hysterectomy if AUB persistent.  Return to clinic prn  "

## 2018-10-22 ENCOUNTER — OFFICE VISIT (OUTPATIENT)
Dept: INTERNAL MEDICINE | Facility: CLINIC | Age: 43
End: 2018-10-22
Payer: COMMERCIAL

## 2018-10-22 ENCOUNTER — LAB VISIT (OUTPATIENT)
Dept: LAB | Facility: HOSPITAL | Age: 43
End: 2018-10-22
Attending: INTERNAL MEDICINE
Payer: COMMERCIAL

## 2018-10-22 VITALS
SYSTOLIC BLOOD PRESSURE: 122 MMHG | BODY MASS INDEX: 40.07 KG/M2 | TEMPERATURE: 99 F | DIASTOLIC BLOOD PRESSURE: 82 MMHG | HEIGHT: 65 IN | RESPIRATION RATE: 16 BRPM | HEART RATE: 85 BPM | WEIGHT: 240.5 LBS

## 2018-10-22 DIAGNOSIS — D50.0 IRON DEFICIENCY ANEMIA DUE TO CHRONIC BLOOD LOSS: ICD-10-CM

## 2018-10-22 DIAGNOSIS — E55.9 VITAMIN D DEFICIENCY: ICD-10-CM

## 2018-10-22 DIAGNOSIS — E55.9 VITAMIN D DEFICIENCY: Primary | ICD-10-CM

## 2018-10-22 DIAGNOSIS — F31.30 BIPOLAR AFFECTIVE DISORDER, CURRENT EPISODE DEPRESSED, CURRENT EPISODE SEVERITY UNSPECIFIED: ICD-10-CM

## 2018-10-22 DIAGNOSIS — E05.90 SUBCLINICAL HYPERTHYROIDISM: ICD-10-CM

## 2018-10-22 LAB
25(OH)D3+25(OH)D2 SERPL-MCNC: 10 NG/ML
BASOPHILS # BLD AUTO: 0.02 K/UL
BASOPHILS NFR BLD: 0.4 %
DIFFERENTIAL METHOD: ABNORMAL
EOSINOPHIL # BLD AUTO: 0.1 K/UL
EOSINOPHIL NFR BLD: 1.5 %
ERYTHROCYTE [DISTWIDTH] IN BLOOD BY AUTOMATED COUNT: 17.5 %
FERRITIN SERPL-MCNC: 15 NG/ML
HCT VFR BLD AUTO: 30.7 %
HGB BLD-MCNC: 10.4 G/DL
IMM GRANULOCYTES # BLD AUTO: 0.01 K/UL
IMM GRANULOCYTES NFR BLD AUTO: 0.2 %
IRON SERPL-MCNC: 83 UG/DL
LYMPHOCYTES # BLD AUTO: 0.9 K/UL
LYMPHOCYTES NFR BLD: 16 %
MCH RBC QN AUTO: 25.6 PG
MCHC RBC AUTO-ENTMCNC: 33.9 G/DL
MCV RBC AUTO: 76 FL
MONOCYTES # BLD AUTO: 0.4 K/UL
MONOCYTES NFR BLD: 7.2 %
NEUTROPHILS # BLD AUTO: 4.1 K/UL
NEUTROPHILS NFR BLD: 74.7 %
NRBC BLD-RTO: 0 /100 WBC
PLATELET # BLD AUTO: 123 K/UL
PMV BLD AUTO: ABNORMAL FL
RBC # BLD AUTO: 4.06 M/UL
SATURATED IRON: 20 %
T4 FREE SERPL-MCNC: 1.01 NG/DL
TOTAL IRON BINDING CAPACITY: 423 UG/DL
TRANSFERRIN SERPL-MCNC: 286 MG/DL
TSH SERPL DL<=0.005 MIU/L-ACNC: 1.2 UIU/ML
WBC # BLD AUTO: 5.43 K/UL

## 2018-10-22 PROCEDURE — 82728 ASSAY OF FERRITIN: CPT

## 2018-10-22 PROCEDURE — 83540 ASSAY OF IRON: CPT

## 2018-10-22 PROCEDURE — 99213 OFFICE O/P EST LOW 20 MIN: CPT | Mod: S$GLB,,, | Performed by: INTERNAL MEDICINE

## 2018-10-22 PROCEDURE — 36415 COLL VENOUS BLD VENIPUNCTURE: CPT | Mod: PO

## 2018-10-22 PROCEDURE — 82306 VITAMIN D 25 HYDROXY: CPT

## 2018-10-22 PROCEDURE — 84443 ASSAY THYROID STIM HORMONE: CPT

## 2018-10-22 PROCEDURE — 99999 PR PBB SHADOW E&M-EST. PATIENT-LVL IV: CPT | Mod: PBBFAC,,, | Performed by: INTERNAL MEDICINE

## 2018-10-22 PROCEDURE — 3008F BODY MASS INDEX DOCD: CPT | Mod: CPTII,S$GLB,, | Performed by: INTERNAL MEDICINE

## 2018-10-22 PROCEDURE — 85025 COMPLETE CBC W/AUTO DIFF WBC: CPT

## 2018-10-22 PROCEDURE — 84439 ASSAY OF FREE THYROXINE: CPT

## 2018-10-22 NOTE — PATIENT INSTRUCTIONS
Given the sensitive nature of Psychiatry visits, you will need to call to schedule your own appointment as our referral coordinator can not schedule this for you. The number for that department is [phone].

## 2018-10-22 NOTE — PROGRESS NOTES
"Subjective:       Patient ID: Rakel Mims is a 43 y.o. female.    Chief Complaint: check up    HPI    She presents to discussed the return of depression symptoms.  She has been diagnosed with bipolar disorder in the past but reports that her symptoms have been well controlled since 2015.  She discussed depression symptoms with me in the past but denied any symptoms of bipolar disorder and felt like this was not her issue at this time.  We tried Wellbutrin but this did not work well for her so she stopped this medication.  She continues to have depressive symptoms which are worsening so she is seeking a referral to Psychiatry for further management.  She denies any suicidal or homicidal ideations or thoughts.  She denies any manic symptoms. She does endorse crying, anhedonia, decreased motivation.     Review of Systems   Constitutional: Positive for unexpected weight change (increase).   Psychiatric/Behavioral: Positive for dysphoric mood. Negative for self-injury and suicidal ideas.   All other systems reviewed and are negative.      Objective:        Vitals:    10/22/18 1525   BP: 122/82   Pulse: 85   Resp: 16   Temp: 98.9 °F (37.2 °C)   TempSrc: Oral   Weight: 109.1 kg (240 lb 8.4 oz)   Height: 5' 5" (1.651 m)       Body mass index is 40.02 kg/m².    Physical Exam   Constitutional: She appears well-developed and well-nourished. No distress.   HENT:   Head: Normocephalic and atraumatic.   Right Ear: External ear normal.   Left Ear: External ear normal.   Eyes: Conjunctivae and EOM are normal.   Neck: Normal range of motion.   Cardiovascular: Normal rate and intact distal pulses.   Pulmonary/Chest: Effort normal.   Musculoskeletal: Normal range of motion.   Neurological: She is alert.   Skin: Skin is warm and dry.   Psychiatric: Her behavior is normal. She exhibits a depressed mood (crying).       Assessment:     1. Vitamin D deficiency    2. Iron deficiency anemia due to chronic blood loss    3. Bipolar " affective disorder, current episode depressed, current episode severity unspecified    4. Subclinical hyperthyroidism           Plan:         1. Vitamin D deficiency  - Vitamin D; Future    2. Iron deficiency anemia due to chronic blood loss  - CBC auto differential; Future  - Iron and TIBC; Future  - Ferritin; Future    3. Bipolar affective disorder, current episode depressed, current episode severity unspecified  - phone number to psychiatry department given to pt to schedule  - we discussed that if her symptoms worsen or if she develops manic symptoms or SI/HI to go to ER immediately or call 911  - Ambulatory Referral to Psychiatry    4. Subclinical hyperthyroidism  - TSH; Future  - T4, free; Future           Patient note was created using MModal Dictation.  Any errors in syntax or even information may not have been identified and edited on initial review prior to signing this note.

## 2018-10-23 ENCOUNTER — PATIENT MESSAGE (OUTPATIENT)
Dept: INTERNAL MEDICINE | Facility: CLINIC | Age: 43
End: 2018-10-23

## 2018-10-23 DIAGNOSIS — E55.9 VITAMIN D DEFICIENCY: ICD-10-CM

## 2018-10-23 DIAGNOSIS — D50.0 IRON DEFICIENCY ANEMIA DUE TO CHRONIC BLOOD LOSS: Primary | ICD-10-CM

## 2018-10-23 RX ORDER — ERGOCALCIFEROL 1.25 MG/1
50000 CAPSULE ORAL
Qty: 12 CAPSULE | Refills: 1 | Status: SHIPPED | OUTPATIENT
Start: 2018-10-23 | End: 2019-01-09

## 2018-10-23 NOTE — TELEPHONE ENCOUNTER
Message sent and results released to patient portal.    Med sent to pharmacy. Pt notified via portal message.

## 2018-10-25 ENCOUNTER — TELEPHONE (OUTPATIENT)
Dept: INTERNAL MEDICINE | Facility: CLINIC | Age: 43
End: 2018-10-25

## 2018-10-25 NOTE — TELEPHONE ENCOUNTER
----- Message from Tita Hernandez sent at 10/25/2018 11:28 AM CDT -----  Contact: self/[phone]  Patient called in regards needing to talk with Dr Pichardo medical assistant about getting another return to work, but if this time can be for Monday 29/18. Please call and advise. Thank you!!!

## 2018-10-25 NOTE — TELEPHONE ENCOUNTER
----- Message from Barrie Sharpe sent at 10/25/2018 12:47 PM CDT -----  Contact: Self [phone]  Pt will like to speak the nurse in regards to an letter for work and medication. Pt will like the doctor to call in something for Flu

## 2018-10-26 NOTE — TELEPHONE ENCOUNTER
----- Message from Aurora Benito sent at 10/26/2018  8:55 AM CDT -----  Contact: Rakel Prasanna [phone]  Patient is returning a phone call.  Who left a message for the patient: Lamar Ramírez  Does patient know what this is regarding:  Msg left yesterday  Comments:

## 2018-10-26 NOTE — TELEPHONE ENCOUNTER
----- Message from Barrie Sharpe sent at 10/26/2018  2:21 PM CDT -----  Contact: Self [phone]  Patient is returning a phone call.  Who left a message for the patient: Lamar  Does patient know what this is regarding: no  Comments:

## 2019-02-08 ENCOUNTER — OFFICE VISIT (OUTPATIENT)
Dept: INTERNAL MEDICINE | Facility: CLINIC | Age: 44
End: 2019-02-08
Payer: COMMERCIAL

## 2019-02-08 VITALS
HEART RATE: 68 BPM | OXYGEN SATURATION: 98 % | WEIGHT: 242.31 LBS | RESPIRATION RATE: 18 BRPM | BODY MASS INDEX: 42.93 KG/M2 | TEMPERATURE: 98 F | DIASTOLIC BLOOD PRESSURE: 78 MMHG | HEIGHT: 63 IN | SYSTOLIC BLOOD PRESSURE: 124 MMHG

## 2019-02-08 DIAGNOSIS — K52.9 ACUTE GASTROENTERITIS: Primary | ICD-10-CM

## 2019-02-08 PROCEDURE — 3008F PR BODY MASS INDEX (BMI) DOCUMENTED: ICD-10-PCS | Mod: CPTII,S$GLB,, | Performed by: HOSPITALIST

## 2019-02-08 PROCEDURE — 99999 PR PBB SHADOW E&M-EST. PATIENT-LVL III: ICD-10-PCS | Mod: PBBFAC,,, | Performed by: HOSPITALIST

## 2019-02-08 PROCEDURE — 99999 PR PBB SHADOW E&M-EST. PATIENT-LVL III: CPT | Mod: PBBFAC,,, | Performed by: HOSPITALIST

## 2019-02-08 PROCEDURE — 3008F BODY MASS INDEX DOCD: CPT | Mod: CPTII,S$GLB,, | Performed by: HOSPITALIST

## 2019-02-08 PROCEDURE — 99213 OFFICE O/P EST LOW 20 MIN: CPT | Mod: S$GLB,,, | Performed by: HOSPITALIST

## 2019-02-08 PROCEDURE — 99213 PR OFFICE/OUTPT VISIT, EST, LEVL III, 20-29 MIN: ICD-10-PCS | Mod: S$GLB,,, | Performed by: HOSPITALIST

## 2019-02-08 RX ORDER — ONDANSETRON 4 MG/1
4 TABLET, FILM COATED ORAL EVERY 6 HOURS PRN
Qty: 30 TABLET | Refills: 1 | Status: SHIPPED | OUTPATIENT
Start: 2019-02-08 | End: 2023-12-19

## 2019-02-08 NOTE — PROGRESS NOTES
"Subjective:     @Patient ID: Rakel Mims is a 44 y.o. female.    Chief Complaint: Nausea; Diarrhea; Dizziness; and Medication Refill    HPI  43 yo F   Presents for urgent visit abdominal pain cramping sensation, diarrhea loose, no blood, dark brown. Vomiting bile x 1. No fevers/chills.  Reported some dizziness earlier.   Reports she visited 2 nursing homes. Also reports eating Mexican food at a new restaurant. Reports visiting family at 2 different nursing homes. Drank water to help symptoms. Also reports she had gumbo that was sour. Thinks that may have been the cause of her diarrhea. Has only had 2 episodes of loose stool.        Review of Systems   Constitutional: Negative for chills and fever.   HENT: Negative for congestion and sore throat.    Eyes: Negative for pain and visual disturbance.   Respiratory: Negative for cough and shortness of breath.    Cardiovascular: Negative for chest pain and leg swelling.   Gastrointestinal: Positive for abdominal pain, diarrhea, nausea and vomiting.   Endocrine: Negative for polydipsia and polyuria.   Genitourinary: Negative for difficulty urinating and dysuria.   Musculoskeletal: Negative for arthralgias and back pain.   Skin: Negative for rash and wound.   Neurological: Positive for dizziness. Negative for weakness and headaches.   Psychiatric/Behavioral: Negative for agitation and confusion.     Past medical history, surgical history, and family medical history reviewed and updated as appropriate.    Medications and allergies reviewed.     Objective:     Vitals:    02/08/19 1022   BP: 124/78   Pulse: 68   Resp: 18   Temp: 98.1 °F (36.7 °C)   SpO2: 98%   Weight: 109.9 kg (242 lb 4.6 oz)   Height: 5' 3" (1.6 m)     Body mass index is 42.92 kg/m².  Physical Exam   Constitutional: She is oriented to person, place, and time. She appears well-developed and well-nourished. No distress.   HENT:   Head: Normocephalic and atraumatic.   Mouth/Throat: Oropharynx is clear and " moist. No oropharyngeal exudate.   Eyes: Conjunctivae are normal. Pupils are equal, round, and reactive to light. Right eye exhibits no discharge. Left eye exhibits no discharge.   Neck: Normal range of motion. Neck supple.   Cardiovascular: Normal rate, regular rhythm and intact distal pulses. Exam reveals no friction rub.   No murmur heard.  Pulmonary/Chest: Effort normal and breath sounds normal.   Abdominal: Soft. Bowel sounds are normal. She exhibits no distension. There is no tenderness. There is no guarding.   Musculoskeletal: Normal range of motion. She exhibits no edema.   Neurological: She is alert and oriented to person, place, and time.   Skin: Skin is warm and dry.   Psychiatric: She has a normal mood and affect. Her behavior is normal.   Vitals reviewed.      Lab Results   Component Value Date    WBC 5.43 10/22/2018    HGB 10.4 (L) 10/22/2018    HCT 30.7 (L) 10/22/2018     (L) 10/22/2018    CHOL 143 06/01/2018    TRIG 43 06/01/2018    HDL 60 06/01/2018    ALT 15 06/01/2018    AST 16 06/01/2018     06/01/2018    K 3.9 06/01/2018     (H) 06/01/2018    CREATININE 0.7 06/01/2018    BUN 15 06/01/2018    CO2 25 06/01/2018    TSH 1.195 10/22/2018    INR 1.0 03/30/2010    HGBA1C <4.0 (A) 06/01/2018       Assessment:     1. Acute gastroenteritis      Plan:   Rakel was seen today for nausea, diarrhea, dizziness and medication refill.    Diagnoses and all orders for this visit:    Acute gastroenteritis  - Recommend oral hydration with fluids (ie water, gatorade). Pt declines imodium at this time. Notify MD if sx do not improve.   -     ondansetron (ZOFRAN) 4 MG tablet; Take 1 tablet (4 mg total) by mouth every 6 (six) hours as needed for Nausea.          Follow-up in about 4 months (around 6/8/2019). for annual     Radha Rocha MD  Internal Medicine    2/8/2019

## 2019-07-24 ENCOUNTER — OFFICE VISIT (OUTPATIENT)
Dept: INTERNAL MEDICINE | Facility: CLINIC | Age: 44
End: 2019-07-24
Payer: COMMERCIAL

## 2019-07-24 VITALS
HEIGHT: 63 IN | BODY MASS INDEX: 42.93 KG/M2 | RESPIRATION RATE: 16 BRPM | SYSTOLIC BLOOD PRESSURE: 120 MMHG | HEART RATE: 68 BPM | WEIGHT: 242.31 LBS | DIASTOLIC BLOOD PRESSURE: 80 MMHG | TEMPERATURE: 98 F

## 2019-07-24 DIAGNOSIS — Z00.00 ANNUAL PHYSICAL EXAM: Primary | ICD-10-CM

## 2019-07-24 DIAGNOSIS — Z12.39 BREAST CANCER SCREENING: ICD-10-CM

## 2019-07-24 DIAGNOSIS — F32.A DEPRESSION, UNSPECIFIED DEPRESSION TYPE: ICD-10-CM

## 2019-07-24 DIAGNOSIS — F41.9 ANXIETY: ICD-10-CM

## 2019-07-24 PROCEDURE — 99999 PR PBB SHADOW E&M-EST. PATIENT-LVL V: CPT | Mod: PBBFAC,,, | Performed by: HOSPITALIST

## 2019-07-24 PROCEDURE — 99396 PREV VISIT EST AGE 40-64: CPT | Mod: S$GLB,,, | Performed by: HOSPITALIST

## 2019-07-24 PROCEDURE — 99396 PR PREVENTIVE VISIT,EST,40-64: ICD-10-PCS | Mod: S$GLB,,, | Performed by: HOSPITALIST

## 2019-07-24 PROCEDURE — 99999 PR PBB SHADOW E&M-EST. PATIENT-LVL V: ICD-10-PCS | Mod: PBBFAC,,, | Performed by: HOSPITALIST

## 2019-07-24 RX ORDER — ALPRAZOLAM 0.25 MG/1
0.25 TABLET ORAL DAILY PRN
Qty: 30 TABLET | Refills: 0 | Status: SHIPPED | OUTPATIENT
Start: 2019-07-24 | End: 2021-07-19 | Stop reason: SDUPTHER

## 2019-07-24 RX ORDER — PAROXETINE 10 MG/1
10 TABLET, FILM COATED ORAL EVERY MORNING
Qty: 30 TABLET | Refills: 3 | Status: SHIPPED | OUTPATIENT
Start: 2019-07-24 | End: 2019-08-22

## 2019-07-24 NOTE — PROGRESS NOTES
Subjective:     @Patient ID: Rakel Mims is a 44 y.o. female.    Chief Complaint: Follow-up (6 month)    HPI    44 y.o. female here for annual.  Patient reports that she has been under lot of stress lately.  Has a history of anxiety and depression.  Patient reports she was seen by [facility] psychiatry a while ago.  States at that time she was initially diagnosed with bipolar she does not have bipolar disorder but has had severe depression.  Reports depression returned when her  dad 2 months ago. Reports her partner has prostate cancer.  Also reports that it has been stressful as it is possible that he chewed on her with another woman.  She also reports that her son will be leaving for college soon and that is also another transition that it has been hard to deal with.  She reports that she would like to get back in with psychiatry.  Reports that she has tried multiple medications in the past and had side effects.  States that she is kind of leery of starting in new antidepressants.  She does report that she was given low-dose Xanax by her aunt when her father passed.  States that it has helped with her anxiety would like to try that medication.  She does endorse having panic attacks.    Has tried:  Zoloft- bp elevated,   Wellbutrin- abnormal thoughts  Latuda- Bp elevated        Lipid disorders/ASCVD risk (ages >/= 45 or >/= 20 if increased risk ): ordered  DM (>45y yearly or if obese, HTN): A1c ordered  Eye exam:    Breast Cancer (40-50y discretion of pt, 50-74y every 1-2 years): Mammogram due  Cervical Cancer (Pap Smear ages 21-65 every 3 years or Pap + HPV q5 years after 30 years of age): 2018     Vaccines:   Influenza (yearly)   Tetanus (every 10 yrs - 1st tdap)  3/2018     Exercise:   Diet: regular       Review of Systems   Constitutional: Negative for chills and fever.   HENT: Negative for congestion and sore throat.    Eyes: Negative for pain and visual disturbance.   Respiratory: Negative for cough  "and shortness of breath.    Cardiovascular: Negative for chest pain and leg swelling.   Gastrointestinal: Negative for abdominal pain, nausea and vomiting.   Endocrine: Negative for polydipsia and polyuria.   Genitourinary: Negative for difficulty urinating and dysuria.   Musculoskeletal: Negative for arthralgias and back pain.   Skin: Negative for rash and wound.   Neurological: Negative for dizziness, weakness and headaches.   Psychiatric/Behavioral: Positive for dysphoric mood. Negative for agitation, confusion and suicidal ideas. The patient is nervous/anxious.      Past medical history, surgical history, and family medical history reviewed and updated as appropriate.    Medications and allergies reviewed.     Objective:     Vitals:    07/24/19 1410   BP: 120/80   BP Location: Right arm   Patient Position: Sitting   BP Method: Large (Manual)   Pulse: 68   Resp: 16   Temp: 98.4 °F (36.9 °C)   TempSrc: Oral   Weight: 109.9 kg (242 lb 4.6 oz)   Height: 5' 3" (1.6 m)     Body mass index is 42.92 kg/m².  Physical Exam   Constitutional: She is oriented to person, place, and time. She appears well-developed and well-nourished. No distress.   HENT:   Head: Normocephalic and atraumatic.   Mouth/Throat: Oropharynx is clear and moist.   Eyes: Conjunctivae are normal. Right eye exhibits no discharge. Left eye exhibits no discharge.   Neck: Normal range of motion. Neck supple.   Cardiovascular: Normal rate, regular rhythm and intact distal pulses. Exam reveals no friction rub.   No murmur heard.  Pulmonary/Chest: Effort normal and breath sounds normal.   Abdominal: Soft. Bowel sounds are normal. She exhibits no distension. There is no tenderness. There is no guarding.   Musculoskeletal: Normal range of motion. She exhibits no edema.   Neurological: She is alert and oriented to person, place, and time.   Skin: Skin is warm and dry.   Psychiatric: Her behavior is normal. Her mood appears anxious.   Vitals reviewed.      Lab " Results   Component Value Date    WBC 5.43 10/22/2018    HGB 10.4 (L) 10/22/2018    HCT 30.7 (L) 10/22/2018     (L) 10/22/2018    CHOL 143 06/01/2018    TRIG 43 06/01/2018    HDL 60 06/01/2018    ALT 15 06/01/2018    AST 16 06/01/2018     06/01/2018    K 3.9 06/01/2018     (H) 06/01/2018    CREATININE 0.7 06/01/2018    BUN 15 06/01/2018    CO2 25 06/01/2018    TSH 1.195 10/22/2018    INR 1.0 03/30/2010    HGBA1C <4.0 (A) 06/01/2018       Assessment:     1. Annual physical exam    2. Depression, unspecified depression type    3. Anxiety    4. Breast cancer screening      Plan:   Rakel was seen today for follow-up.    Diagnoses and all orders for this visit:    Annual physical exam  -     Comprehensive metabolic panel; Future  -     CBC auto differential; Future  -     TSH; Future  -     Vitamin D; Future  -     Lipid panel; Future  -     HIV 1/2 Ag/Ab (4th Gen); Future  -     Urinalysis; Future  -     HEMOGLOBIN A1C; Future    Depression, unspecified depression type  - Feel that patient needs to be seen by Psychiatry for management of depression and anxiety.  Has had issues with multiple medications in the past.  And unclear if patient has bipolar disorder.  -     paroxetine (PAXIL) 10 MG tablet; Take 1 tablet (10 mg total) by mouth every morning.  -     Ambulatory Referral to Psychiatry    Anxiety  - patient counseled on possible sedation with Xanax.  Not to take with alcohol etc.  Also counseled patient that pending psychiatry evaluation if they decide to remove her off this medication will defer to them.  -     ALPRAZolam (XANAX) 0.25 MG tablet; Take 1 tablet (0.25 mg total) by mouth daily as needed for Anxiety.  -     paroxetine (PAXIL) 10 MG tablet; Take 1 tablet (10 mg total) by mouth every morning.  -     Ambulatory Referral to Psychiatry    Breast cancer screening  -     Mammo Digital Screening Bilat w/ Blu; Future        Follow up in about 3 months (around 10/24/2019), or if symptoms  worsen or fail to improve.    Radha Rocha MD  Internal Medicine    7/24/2019

## 2019-08-22 ENCOUNTER — TELEPHONE (OUTPATIENT)
Dept: INTERNAL MEDICINE | Facility: CLINIC | Age: 44
End: 2019-08-22

## 2019-08-22 ENCOUNTER — OFFICE VISIT (OUTPATIENT)
Dept: INTERNAL MEDICINE | Facility: CLINIC | Age: 44
End: 2019-08-22
Payer: COMMERCIAL

## 2019-08-22 VITALS
BODY MASS INDEX: 40.97 KG/M2 | SYSTOLIC BLOOD PRESSURE: 122 MMHG | DIASTOLIC BLOOD PRESSURE: 68 MMHG | HEIGHT: 63 IN | RESPIRATION RATE: 16 BRPM | HEART RATE: 76 BPM | WEIGHT: 231.25 LBS | TEMPERATURE: 98 F

## 2019-08-22 DIAGNOSIS — F31.30 BIPOLAR AFFECTIVE DISORDER, CURRENT EPISODE DEPRESSED, CURRENT EPISODE SEVERITY UNSPECIFIED: Primary | ICD-10-CM

## 2019-08-22 DIAGNOSIS — F41.9 ANXIETY: ICD-10-CM

## 2019-08-22 PROCEDURE — 99214 OFFICE O/P EST MOD 30 MIN: CPT | Mod: S$GLB,,, | Performed by: HOSPITALIST

## 2019-08-22 PROCEDURE — 99999 PR PBB SHADOW E&M-EST. PATIENT-LVL III: CPT | Mod: PBBFAC,,, | Performed by: HOSPITALIST

## 2019-08-22 PROCEDURE — 99999 PR PBB SHADOW E&M-EST. PATIENT-LVL III: ICD-10-PCS | Mod: PBBFAC,,, | Performed by: HOSPITALIST

## 2019-08-22 PROCEDURE — 3008F PR BODY MASS INDEX (BMI) DOCUMENTED: ICD-10-PCS | Mod: CPTII,S$GLB,, | Performed by: HOSPITALIST

## 2019-08-22 PROCEDURE — 99214 PR OFFICE/OUTPT VISIT, EST, LEVL IV, 30-39 MIN: ICD-10-PCS | Mod: S$GLB,,, | Performed by: HOSPITALIST

## 2019-08-22 PROCEDURE — 3008F BODY MASS INDEX DOCD: CPT | Mod: CPTII,S$GLB,, | Performed by: HOSPITALIST

## 2019-08-22 RX ORDER — HYDROCODONE BITARTRATE AND ACETAMINOPHEN 5; 325 MG/1; MG/1
1 TABLET ORAL EVERY 6 HOURS PRN
COMMUNITY
End: 2021-07-19

## 2019-08-22 RX ORDER — QUETIAPINE FUMARATE 25 MG/1
25 TABLET, FILM COATED ORAL NIGHTLY
Qty: 30 TABLET | Refills: 2 | Status: SHIPPED | OUTPATIENT
Start: 2019-08-22 | End: 2021-07-19

## 2019-08-22 NOTE — TELEPHONE ENCOUNTER
----- Message from Antoinette Alford sent at 8/22/2019 12:46 PM CDT -----  Contact: Marie/ Anna/ [phone]  Please call regarding a leave of absence claim .

## 2019-08-22 NOTE — PROGRESS NOTES
Subjective:     @Patient ID: Rakel Mims is a 44 y.o. female.    Chief Complaint: Employment Physical (fill out form) and Medication Refill    HPI  44-year-old female presents for follow-up of anxiety depression.  Patient also needs [place] paperwork [place] for her job at [employer].  Patient reports that she stopped taking the Paxil about a week ago as it was making her feel worse.  States she was irritable and crying easily.  She reports that she has requested job in [place] which she has accepted and will be moving in 1 month.  Reports she has not yet seen psychiatry however with the move she will have to establish care in [place] with a PCP and Psychiatry.  She reports she would like to start another medication to help her mood in the interim she is requesting intermittent leave for flare-up of her episodes.  She reports that she is happy that her son has started college and is doing well.  She reports that her  will help her move to [place] but she is not sure that he will stay there.     Review of Systems   Constitutional: Negative for chills and fever.   HENT: Negative for congestion and sore throat.    Eyes: Negative for pain and visual disturbance.   Respiratory: Negative for cough and shortness of breath.    Cardiovascular: Negative for chest pain and leg swelling.   Gastrointestinal: Negative for abdominal pain, nausea and vomiting.   Endocrine: Negative for polydipsia and polyuria.   Genitourinary: Negative for difficulty urinating and dysuria.   Musculoskeletal: Negative for arthralgias and back pain.   Skin: Negative for rash and wound.   Neurological: Negative for dizziness, weakness and headaches.   Psychiatric/Behavioral: Negative for confusion. The patient is nervous/anxious.      Past medical history, surgical history, and family medical history reviewed and updated as appropriate.    Medications and allergies reviewed.     Objective:     Vitals:    08/22/19 1032   BP: 122/68   BP  "Location: Right arm   Patient Position: Sitting   BP Method: Large (Manual)   Pulse: 76   Resp: 16   Temp: 98.2 °F (36.8 °C)   TempSrc: Oral   Weight: 104.9 kg (231 lb 4.2 oz)   Height: 5' 3" (1.6 m)     Body mass index is 40.97 kg/m².  Physical Exam   Constitutional: She is oriented to person, place, and time. She appears well-developed and well-nourished. No distress.   HENT:   Head: Normocephalic and atraumatic.   Eyes: Conjunctivae are normal. Right eye exhibits no discharge. Left eye exhibits no discharge.   Neck: Normal range of motion. Neck supple.   Cardiovascular: Normal rate, regular rhythm and intact distal pulses. Exam reveals no friction rub.   No murmur heard.  Pulmonary/Chest: Effort normal and breath sounds normal.   Musculoskeletal: Normal range of motion. She exhibits no edema.   Neurological: She is alert and oriented to person, place, and time.   Skin: Skin is warm and dry.   Psychiatric: Her mood appears anxious. Her speech is not delayed and not slurred.   Vitals reviewed.      Lab Results   Component Value Date    WBC 5.43 10/22/2018    HGB 10.4 (L) 10/22/2018    HCT 30.7 (L) 10/22/2018     (L) 10/22/2018    CHOL 143 06/01/2018    TRIG 43 06/01/2018    HDL 60 06/01/2018    ALT 15 06/01/2018    AST 16 06/01/2018     06/01/2018    K 3.9 06/01/2018     (H) 06/01/2018    CREATININE 0.7 06/01/2018    BUN 15 06/01/2018    CO2 25 06/01/2018    TSH 1.195 10/22/2018    INR 1.0 03/30/2010    HGBA1C <4.0 (A) 06/01/2018       Assessment:     1. Bipolar affective disorder, current episode depressed, current episode severity unspecified    2. Anxiety      Plan:   Rakel was seen today for employment physical and medication refill.    Diagnoses and all orders for this visit:    Bipolar affective disorder, current episode depressed, current episode severity unspecified  - patient reports she has tried multiple medications including lithium, Latuda, Zoloft, Prozac.  States she has multiple it " issues with these medications.  She is also not been able to do well with the Paxil.  Have counseled patient that she she needs to see Psychiatry when she moved to [place] in a few weeks to help with medical management.  She has tried Seroquel in the past states that it does make her sleepy but she is willing to resume.  Will start low-dose Seroquel to take at night at. Patient will follow up prior to move  -     QUEtiapine (SEROQUEL) 25 MG Tab; Take 1 tablet (25 mg total) by mouth every evening.  - During office visit patient's FMLA forms were filled out    Anxiety        - See above. Pt remains on xanax prn. Will f/u with pscyhiatry.         Follow up in about 3 weeks (around 9/12/2019).    Radha Rocha MD  Internal Medicine    8/22/2019

## 2019-09-04 ENCOUNTER — TELEPHONE (OUTPATIENT)
Dept: INTERNAL MEDICINE | Facility: CLINIC | Age: 44
End: 2019-09-04

## 2019-09-04 NOTE — TELEPHONE ENCOUNTER
----- Message from Jenniffer Moreno sent at 9/4/2019  2:23 PM CDT -----  Contact: Pt [phone]  Contact: Marie/ Anna/ [phone]  Please call regarding a leave of absence claim.    She needs this done by 9/11/19.    Thank you

## 2020-10-05 ENCOUNTER — PATIENT MESSAGE (OUTPATIENT)
Dept: ADMINISTRATIVE | Facility: HOSPITAL | Age: 45
End: 2020-10-05

## 2020-10-30 DIAGNOSIS — Z12.31 OTHER SCREENING MAMMOGRAM: ICD-10-CM

## 2021-01-04 ENCOUNTER — PATIENT MESSAGE (OUTPATIENT)
Dept: ADMINISTRATIVE | Facility: HOSPITAL | Age: 46
End: 2021-01-04

## 2021-04-05 ENCOUNTER — PATIENT MESSAGE (OUTPATIENT)
Dept: ADMINISTRATIVE | Facility: HOSPITAL | Age: 46
End: 2021-04-05

## 2021-04-16 ENCOUNTER — PATIENT MESSAGE (OUTPATIENT)
Dept: RESEARCH | Facility: HOSPITAL | Age: 46
End: 2021-04-16

## 2021-06-22 ENCOUNTER — PATIENT OUTREACH (OUTPATIENT)
Dept: ADMINISTRATIVE | Facility: OTHER | Age: 46
End: 2021-06-22

## 2021-06-22 ENCOUNTER — OFFICE VISIT (OUTPATIENT)
Dept: OPTOMETRY | Facility: CLINIC | Age: 46
End: 2021-06-22
Payer: COMMERCIAL

## 2021-06-22 DIAGNOSIS — H20.9 IRITIS: Primary | ICD-10-CM

## 2021-06-22 PROCEDURE — 92004 PR EYE EXAM, NEW PATIENT,COMPREHESV: ICD-10-PCS | Mod: S$GLB,,, | Performed by: OPTOMETRIST

## 2021-06-22 PROCEDURE — 1125F AMNT PAIN NOTED PAIN PRSNT: CPT | Mod: S$GLB,,, | Performed by: OPTOMETRIST

## 2021-06-22 PROCEDURE — 1125F PR PAIN SEVERITY QUANTIFIED, PAIN PRESENT: ICD-10-PCS | Mod: S$GLB,,, | Performed by: OPTOMETRIST

## 2021-06-22 PROCEDURE — 92004 COMPRE OPH EXAM NEW PT 1/>: CPT | Mod: S$GLB,,, | Performed by: OPTOMETRIST

## 2021-06-22 PROCEDURE — 99999 PR PBB SHADOW E&M-EST. PATIENT-LVL II: CPT | Mod: PBBFAC,,, | Performed by: OPTOMETRIST

## 2021-06-22 PROCEDURE — 99999 PR PBB SHADOW E&M-EST. PATIENT-LVL II: ICD-10-PCS | Mod: PBBFAC,,, | Performed by: OPTOMETRIST

## 2021-06-22 RX ORDER — PREDNISOLONE ACETATE 10 MG/ML
1 SUSPENSION/ DROPS OPHTHALMIC 4 TIMES DAILY
Qty: 5 ML | Refills: 0 | Status: SHIPPED | OUTPATIENT
Start: 2021-06-22 | End: 2021-06-22

## 2021-06-22 RX ORDER — PREDNISOLONE ACETATE 10 MG/ML
1 SUSPENSION/ DROPS OPHTHALMIC 4 TIMES DAILY
Qty: 5 ML | Refills: 1 | Status: SHIPPED | OUTPATIENT
Start: 2021-06-22 | End: 2021-06-29

## 2021-07-06 ENCOUNTER — PATIENT MESSAGE (OUTPATIENT)
Dept: ADMINISTRATIVE | Facility: HOSPITAL | Age: 46
End: 2021-07-06

## 2021-07-19 ENCOUNTER — OFFICE VISIT (OUTPATIENT)
Dept: INTERNAL MEDICINE | Facility: CLINIC | Age: 46
End: 2021-07-19
Payer: COMMERCIAL

## 2021-07-19 VITALS
OXYGEN SATURATION: 98 % | SYSTOLIC BLOOD PRESSURE: 124 MMHG | DIASTOLIC BLOOD PRESSURE: 86 MMHG | WEIGHT: 234.81 LBS | TEMPERATURE: 97 F | HEART RATE: 64 BPM | BODY MASS INDEX: 41.61 KG/M2 | HEIGHT: 63 IN

## 2021-07-19 DIAGNOSIS — F31.30 BIPOLAR AFFECTIVE DISORDER, CURRENT EPISODE DEPRESSED, CURRENT EPISODE SEVERITY UNSPECIFIED: ICD-10-CM

## 2021-07-19 DIAGNOSIS — F41.1 GENERALIZED ANXIETY DISORDER: ICD-10-CM

## 2021-07-19 DIAGNOSIS — Z11.4 ENCOUNTER FOR SCREENING FOR HIV: ICD-10-CM

## 2021-07-19 DIAGNOSIS — G47.00 INSOMNIA, UNSPECIFIED TYPE: ICD-10-CM

## 2021-07-19 DIAGNOSIS — E05.90 SUBCLINICAL HYPERTHYROIDISM: ICD-10-CM

## 2021-07-19 DIAGNOSIS — F33.9 EPISODE OF RECURRENT MAJOR DEPRESSIVE DISORDER, UNSPECIFIED DEPRESSION EPISODE SEVERITY: ICD-10-CM

## 2021-07-19 DIAGNOSIS — Z11.59 ENCOUNTER FOR HEPATITIS C SCREENING TEST FOR LOW RISK PATIENT: ICD-10-CM

## 2021-07-19 DIAGNOSIS — Z76.89 ENCOUNTER TO ESTABLISH CARE WITH NEW DOCTOR: Primary | ICD-10-CM

## 2021-07-19 PROCEDURE — 99999 PR PBB SHADOW E&M-EST. PATIENT-LVL V: CPT | Mod: PBBFAC,,, | Performed by: HOSPITALIST

## 2021-07-19 PROCEDURE — 99215 OFFICE O/P EST HI 40 MIN: CPT | Mod: S$GLB,,, | Performed by: HOSPITALIST

## 2021-07-19 PROCEDURE — 99215 PR OFFICE/OUTPT VISIT, EST, LEVL V, 40-54 MIN: ICD-10-PCS | Mod: S$GLB,,, | Performed by: HOSPITALIST

## 2021-07-19 PROCEDURE — 99999 PR PBB SHADOW E&M-EST. PATIENT-LVL V: ICD-10-PCS | Mod: PBBFAC,,, | Performed by: HOSPITALIST

## 2021-07-19 PROCEDURE — 99215 OFFICE O/P EST HI 40 MIN: CPT | Mod: PBBFAC,PO | Performed by: HOSPITALIST

## 2021-07-19 RX ORDER — MECLIZINE HYDROCHLORIDE 25 MG/1
25 TABLET ORAL
COMMUNITY
Start: 2021-03-19 | End: 2023-12-19

## 2021-07-19 RX ORDER — DOXEPIN HYDROCHLORIDE 25 MG/1
1 CAPSULE ORAL NIGHTLY
COMMUNITY
Start: 2021-07-08 | End: 2021-08-17 | Stop reason: SDUPTHER

## 2021-07-19 RX ORDER — HYDROCHLOROTHIAZIDE 25 MG/1
25 TABLET ORAL
COMMUNITY
Start: 2021-03-10 | End: 2023-08-28 | Stop reason: CLARIF

## 2021-07-19 RX ORDER — OLANZAPINE 5 MG/1
5 TABLET ORAL
COMMUNITY
Start: 2021-04-15 | End: 2021-07-19

## 2021-07-19 RX ORDER — PROPRANOLOL HYDROCHLORIDE 20 MG/1
1 TABLET ORAL 3 TIMES DAILY
COMMUNITY
Start: 2021-06-14 | End: 2023-08-28 | Stop reason: CLARIF

## 2021-07-19 RX ORDER — ALPRAZOLAM 0.5 MG/1
0.5 TABLET ORAL NIGHTLY PRN
Qty: 30 TABLET | Refills: 1 | Status: SHIPPED | OUTPATIENT
Start: 2021-07-19 | End: 2023-12-19

## 2021-07-19 RX ORDER — LOSARTAN POTASSIUM 50 MG/1
1 TABLET ORAL DAILY
COMMUNITY
Start: 2021-06-24 | End: 2023-08-28 | Stop reason: CLARIF

## 2021-07-19 RX ORDER — GABAPENTIN 100 MG/1
1 CAPSULE ORAL 3 TIMES DAILY
COMMUNITY
Start: 2021-06-14 | End: 2023-08-28 | Stop reason: CLARIF

## 2021-07-23 ENCOUNTER — PATIENT OUTREACH (OUTPATIENT)
Dept: ADMINISTRATIVE | Facility: HOSPITAL | Age: 46
End: 2021-07-23

## 2021-07-26 ENCOUNTER — PATIENT OUTREACH (OUTPATIENT)
Dept: ADMINISTRATIVE | Facility: HOSPITAL | Age: 46
End: 2021-07-26

## 2021-08-03 ENCOUNTER — OFFICE VISIT (OUTPATIENT)
Dept: INTERNAL MEDICINE | Facility: CLINIC | Age: 46
End: 2021-08-03
Payer: COMMERCIAL

## 2021-08-03 ENCOUNTER — HOSPITAL ENCOUNTER (OUTPATIENT)
Dept: RADIOLOGY | Facility: HOSPITAL | Age: 46
Discharge: HOME | End: 2021-08-03
Attending: NURSE PRACTITIONER
Payer: COMMERCIAL

## 2021-08-03 VITALS
WEIGHT: 235.44 LBS | HEART RATE: 63 BPM | RESPIRATION RATE: 16 BRPM | DIASTOLIC BLOOD PRESSURE: 80 MMHG | OXYGEN SATURATION: 99 % | HEIGHT: 63 IN | TEMPERATURE: 98 F | SYSTOLIC BLOOD PRESSURE: 130 MMHG | BODY MASS INDEX: 41.71 KG/M2

## 2021-08-03 DIAGNOSIS — R07.9 LEFT-SIDED CHEST PAIN: ICD-10-CM

## 2021-08-03 DIAGNOSIS — M79.602 LEFT ARM PAIN: ICD-10-CM

## 2021-08-03 DIAGNOSIS — M25.512 ACUTE PAIN OF LEFT SHOULDER: ICD-10-CM

## 2021-08-03 DIAGNOSIS — R07.9 LEFT-SIDED CHEST PAIN: Primary | ICD-10-CM

## 2021-08-03 PROCEDURE — 93010 EKG 12-LEAD: ICD-10-PCS | Mod: S$GLB,,, | Performed by: INTERNAL MEDICINE

## 2021-08-03 PROCEDURE — 1159F MED LIST DOCD IN RCRD: CPT | Mod: CPTII,S$GLB,, | Performed by: NURSE PRACTITIONER

## 2021-08-03 PROCEDURE — 72050 X-RAY EXAM NECK SPINE 4/5VWS: CPT | Mod: 26,,, | Performed by: RADIOLOGY

## 2021-08-03 PROCEDURE — 3079F DIAST BP 80-89 MM HG: CPT | Mod: CPTII,S$GLB,, | Performed by: NURSE PRACTITIONER

## 2021-08-03 PROCEDURE — 3008F PR BODY MASS INDEX (BMI) DOCUMENTED: ICD-10-PCS | Mod: CPTII,S$GLB,, | Performed by: NURSE PRACTITIONER

## 2021-08-03 PROCEDURE — 72050 X-RAY EXAM NECK SPINE 4/5VWS: CPT | Mod: TC,PO

## 2021-08-03 PROCEDURE — 3075F PR MOST RECENT SYSTOLIC BLOOD PRESS GE 130-139MM HG: ICD-10-PCS | Mod: CPTII,S$GLB,, | Performed by: NURSE PRACTITIONER

## 2021-08-03 PROCEDURE — 99214 OFFICE O/P EST MOD 30 MIN: CPT | Mod: S$GLB,,, | Performed by: NURSE PRACTITIONER

## 2021-08-03 PROCEDURE — 93010 ELECTROCARDIOGRAM REPORT: CPT | Mod: S$GLB,,, | Performed by: INTERNAL MEDICINE

## 2021-08-03 PROCEDURE — 3008F BODY MASS INDEX DOCD: CPT | Mod: CPTII,S$GLB,, | Performed by: NURSE PRACTITIONER

## 2021-08-03 PROCEDURE — 93005 EKG 12-LEAD: ICD-10-PCS | Mod: S$GLB,,, | Performed by: NURSE PRACTITIONER

## 2021-08-03 PROCEDURE — 1125F AMNT PAIN NOTED PAIN PRSNT: CPT | Mod: CPTII,S$GLB,, | Performed by: NURSE PRACTITIONER

## 2021-08-03 PROCEDURE — 73030 X-RAY EXAM OF SHOULDER: CPT | Mod: TC,PO,LT

## 2021-08-03 PROCEDURE — 1159F PR MEDICATION LIST DOCUMENTED IN MEDICAL RECORD: ICD-10-PCS | Mod: CPTII,S$GLB,, | Performed by: NURSE PRACTITIONER

## 2021-08-03 PROCEDURE — 99999 PR PBB SHADOW E&M-EST. PATIENT-LVL IV: ICD-10-PCS | Mod: PBBFAC,,, | Performed by: NURSE PRACTITIONER

## 2021-08-03 PROCEDURE — 73030 X-RAY EXAM OF SHOULDER: CPT | Mod: 26,LT,, | Performed by: RADIOLOGY

## 2021-08-03 PROCEDURE — 73030 XR SHOULDER COMPLETE 2 OR MORE VIEWS LEFT: ICD-10-PCS | Mod: 26,LT,, | Performed by: RADIOLOGY

## 2021-08-03 PROCEDURE — 3079F PR MOST RECENT DIASTOLIC BLOOD PRESSURE 80-89 MM HG: ICD-10-PCS | Mod: CPTII,S$GLB,, | Performed by: NURSE PRACTITIONER

## 2021-08-03 PROCEDURE — 93005 ELECTROCARDIOGRAM TRACING: CPT | Mod: S$GLB,,, | Performed by: NURSE PRACTITIONER

## 2021-08-03 PROCEDURE — 3075F SYST BP GE 130 - 139MM HG: CPT | Mod: CPTII,S$GLB,, | Performed by: NURSE PRACTITIONER

## 2021-08-03 PROCEDURE — 72050 XR CERVICAL SPINE COMPLETE 5 VIEW: ICD-10-PCS | Mod: 26,,, | Performed by: RADIOLOGY

## 2021-08-03 PROCEDURE — 99999 PR PBB SHADOW E&M-EST. PATIENT-LVL IV: CPT | Mod: PBBFAC,,, | Performed by: NURSE PRACTITIONER

## 2021-08-03 PROCEDURE — 1125F PR PAIN SEVERITY QUANTIFIED, PAIN PRESENT: ICD-10-PCS | Mod: CPTII,S$GLB,, | Performed by: NURSE PRACTITIONER

## 2021-08-03 PROCEDURE — 99214 PR OFFICE/OUTPT VISIT, EST, LEVL IV, 30-39 MIN: ICD-10-PCS | Mod: S$GLB,,, | Performed by: NURSE PRACTITIONER

## 2021-08-17 ENCOUNTER — OFFICE VISIT (OUTPATIENT)
Dept: PSYCHIATRY | Facility: CLINIC | Age: 46
End: 2021-08-17
Payer: COMMERCIAL

## 2021-08-17 VITALS
WEIGHT: 225.75 LBS | BODY MASS INDEX: 39.99 KG/M2 | SYSTOLIC BLOOD PRESSURE: 182 MMHG | DIASTOLIC BLOOD PRESSURE: 92 MMHG | HEART RATE: 100 BPM

## 2021-08-17 DIAGNOSIS — F43.21 GRIEF: ICD-10-CM

## 2021-08-17 DIAGNOSIS — F31.30 BIPOLAR AFFECTIVE DISORDER, CURRENT EPISODE DEPRESSED, CURRENT EPISODE SEVERITY UNSPECIFIED: ICD-10-CM

## 2021-08-17 DIAGNOSIS — F41.9 ANXIETY: Primary | ICD-10-CM

## 2021-08-17 PROCEDURE — 99204 OFFICE O/P NEW MOD 45 MIN: CPT | Mod: S$GLB,,, | Performed by: PSYCHIATRY & NEUROLOGY

## 2021-08-17 PROCEDURE — 99999 PR PBB SHADOW E&M-EST. PATIENT-LVL II: ICD-10-PCS | Mod: PBBFAC,,, | Performed by: PSYCHIATRY & NEUROLOGY

## 2021-08-17 PROCEDURE — 99999 PR PBB SHADOW E&M-EST. PATIENT-LVL II: CPT | Mod: PBBFAC,,, | Performed by: PSYCHIATRY & NEUROLOGY

## 2021-08-17 PROCEDURE — 99204 PR OFFICE/OUTPT VISIT, NEW, LEVL IV, 45-59 MIN: ICD-10-PCS | Mod: S$GLB,,, | Performed by: PSYCHIATRY & NEUROLOGY

## 2021-08-17 RX ORDER — HYDROXYZINE HYDROCHLORIDE 25 MG/1
25 TABLET, FILM COATED ORAL 3 TIMES DAILY PRN
Qty: 75 TABLET | Refills: 2 | Status: SHIPPED | OUTPATIENT
Start: 2021-08-17 | End: 2021-09-16

## 2021-08-17 RX ORDER — DOXEPIN HYDROCHLORIDE 25 MG/1
25 CAPSULE ORAL NIGHTLY
Qty: 30 CAPSULE | Refills: 1 | Status: SHIPPED | OUTPATIENT
Start: 2021-08-17 | End: 2023-12-19

## 2021-08-27 ENCOUNTER — OFFICE VISIT (OUTPATIENT)
Dept: PSYCHIATRY | Facility: CLINIC | Age: 46
End: 2021-08-27
Payer: COMMERCIAL

## 2021-08-27 DIAGNOSIS — F31.30 BIPOLAR AFFECTIVE DISORDER, CURRENT EPISODE DEPRESSED, CURRENT EPISODE SEVERITY UNSPECIFIED: ICD-10-CM

## 2021-08-27 DIAGNOSIS — F43.21 GRIEF: ICD-10-CM

## 2021-08-27 DIAGNOSIS — F41.9 ANXIETY: Primary | ICD-10-CM

## 2021-08-27 PROCEDURE — 90791 PSYCH DIAGNOSTIC EVALUATION: CPT | Mod: S$GLB,,, | Performed by: SOCIAL WORKER

## 2021-08-27 PROCEDURE — 90791 PR PSYCHIATRIC DIAGNOSTIC EVALUATION: ICD-10-PCS | Mod: S$GLB,,, | Performed by: SOCIAL WORKER

## 2021-09-20 ENCOUNTER — TELEPHONE (OUTPATIENT)
Dept: INTERNAL MEDICINE | Facility: CLINIC | Age: 46
End: 2021-09-20

## 2021-10-22 ENCOUNTER — OCCUPATIONAL HEALTH (OUTPATIENT)
Dept: URGENT CARE | Facility: CLINIC | Age: 46
End: 2021-10-22

## 2021-10-22 DIAGNOSIS — Z02.83 ENCOUNTER FOR DRUG SCREENING: Primary | ICD-10-CM

## 2021-10-22 LAB
CTP QC/QA: YES
POC 5 PANEL DRUG SCREEN: NEGATIVE

## 2021-10-22 PROCEDURE — 80305 POCT RAPID DRUG SCREEN 5 PANEL: ICD-10-PCS | Mod: S$GLB,,, | Performed by: NURSE PRACTITIONER

## 2021-10-22 PROCEDURE — 80305 DRUG TEST PRSMV DIR OPT OBS: CPT | Mod: S$GLB,,, | Performed by: NURSE PRACTITIONER

## 2021-10-25 ENCOUNTER — OCCUPATIONAL HEALTH (OUTPATIENT)
Dept: URGENT CARE | Facility: CLINIC | Age: 46
End: 2021-10-25

## 2021-10-25 DIAGNOSIS — Z11.1 PPD SCREENING TEST: Primary | ICD-10-CM

## 2021-10-25 PROCEDURE — 71045 XR CHEST 1 VIEW: ICD-10-PCS | Mod: FY,TIER,S$GLB, | Performed by: RADIOLOGY

## 2021-10-25 PROCEDURE — 71045 X-RAY EXAM CHEST 1 VIEW: CPT | Mod: FY,TIER,S$GLB, | Performed by: RADIOLOGY

## 2021-11-23 ENCOUNTER — HOSPITAL ENCOUNTER (EMERGENCY)
Facility: HOSPITAL | Age: 46
Discharge: HOME | End: 2021-11-23
Attending: EMERGENCY MEDICINE
Payer: OTHER GOVERNMENT

## 2021-11-23 VITALS
SYSTOLIC BLOOD PRESSURE: 180 MMHG | RESPIRATION RATE: 16 BRPM | TEMPERATURE: 98 F | OXYGEN SATURATION: 98 % | HEART RATE: 46 BPM | DIASTOLIC BLOOD PRESSURE: 81 MMHG

## 2021-11-23 DIAGNOSIS — R05.9 COUGH: ICD-10-CM

## 2021-11-23 DIAGNOSIS — J06.9 VIRAL URI WITH COUGH: Primary | ICD-10-CM

## 2021-11-23 DIAGNOSIS — R07.9 CHEST PAIN: ICD-10-CM

## 2021-11-23 LAB
BASOPHILS # BLD AUTO: 0.02 K/UL (ref 0–0.2)
BASOPHILS NFR BLD: 0.4 % (ref 0–1.9)
BUN SERPL-MCNC: 5 MG/DL (ref 6–30)
CHLORIDE SERPL-SCNC: 103 MMOL/L (ref 95–110)
CREAT SERPL-MCNC: 0.5 MG/DL (ref 0.5–1.4)
CTP QC/QA: YES
CTP QC/QA: YES
DIFFERENTIAL METHOD: ABNORMAL
EOSINOPHIL # BLD AUTO: 0.1 K/UL (ref 0–0.5)
EOSINOPHIL NFR BLD: 1.1 % (ref 0–8)
ERYTHROCYTE [DISTWIDTH] IN BLOOD BY AUTOMATED COUNT: 15.4 % (ref 11.5–14.5)
GLUCOSE SERPL-MCNC: 98 MG/DL (ref 70–110)
HCT VFR BLD AUTO: 34.3 % (ref 37–48.5)
HCT VFR BLD CALC: 37 %PCV (ref 36–54)
HGB BLD-MCNC: 12 G/DL (ref 12–16)
IMM GRANULOCYTES # BLD AUTO: 0.01 K/UL (ref 0–0.04)
IMM GRANULOCYTES NFR BLD AUTO: 0.2 % (ref 0–0.5)
LYMPHOCYTES # BLD AUTO: 1.5 K/UL (ref 1–4.8)
LYMPHOCYTES NFR BLD: 25.8 % (ref 18–48)
MCH RBC QN AUTO: 27.5 PG (ref 27–31)
MCHC RBC AUTO-ENTMCNC: 35 G/DL (ref 32–36)
MCV RBC AUTO: 79 FL (ref 82–98)
MONOCYTES # BLD AUTO: 0.4 K/UL (ref 0.3–1)
MONOCYTES NFR BLD: 6.2 % (ref 4–15)
NEUTROPHILS # BLD AUTO: 3.8 K/UL (ref 1.8–7.7)
NEUTROPHILS NFR BLD: 66.3 % (ref 38–73)
NRBC BLD-RTO: 0 /100 WBC
PLATELET # BLD AUTO: 150 K/UL (ref 150–450)
PMV BLD AUTO: 9.4 FL (ref 9.2–12.9)
POC IONIZED CALCIUM: 1.25 MMOL/L (ref 1.06–1.42)
POC MOLECULAR INFLUENZA A AGN: NEGATIVE
POC MOLECULAR INFLUENZA B AGN: NEGATIVE
POC TCO2 (MEASURED): 24 MMOL/L (ref 23–29)
POTASSIUM BLD-SCNC: 3.7 MMOL/L (ref 3.5–5.1)
RBC # BLD AUTO: 4.36 M/UL (ref 4–5.4)
SAMPLE: ABNORMAL
SARS-COV-2 RDRP RESP QL NAA+PROBE: NEGATIVE
SODIUM BLD-SCNC: 139 MMOL/L (ref 136–145)
WBC # BLD AUTO: 5.65 K/UL (ref 3.9–12.7)

## 2021-11-23 PROCEDURE — 25000242 PHARM REV CODE 250 ALT 637 W/ HCPCS: Performed by: EMERGENCY MEDICINE

## 2021-11-23 PROCEDURE — 96374 THER/PROPH/DIAG INJ IV PUSH: CPT

## 2021-11-23 PROCEDURE — 99285 EMERGENCY DEPT VISIT HI MDM: CPT | Mod: 25

## 2021-11-23 PROCEDURE — 80047 BASIC METABLC PNL IONIZED CA: CPT

## 2021-11-23 PROCEDURE — 85025 COMPLETE CBC W/AUTO DIFF WBC: CPT | Performed by: EMERGENCY MEDICINE

## 2021-11-23 PROCEDURE — 86803 HEPATITIS C AB TEST: CPT | Performed by: EMERGENCY MEDICINE

## 2021-11-23 PROCEDURE — 87389 HIV-1 AG W/HIV-1&-2 AB AG IA: CPT | Performed by: EMERGENCY MEDICINE

## 2021-11-23 PROCEDURE — 87502 INFLUENZA DNA AMP PROBE: CPT

## 2021-11-23 PROCEDURE — 99284 EMERGENCY DEPT VISIT MOD MDM: CPT | Mod: CS,,, | Performed by: EMERGENCY MEDICINE

## 2021-11-23 PROCEDURE — 63600175 PHARM REV CODE 636 W HCPCS: Performed by: EMERGENCY MEDICINE

## 2021-11-23 PROCEDURE — 93010 ELECTROCARDIOGRAM REPORT: CPT | Mod: ,,, | Performed by: INTERNAL MEDICINE

## 2021-11-23 PROCEDURE — U0002 COVID-19 LAB TEST NON-CDC: HCPCS | Performed by: EMERGENCY MEDICINE

## 2021-11-23 PROCEDURE — 93005 ELECTROCARDIOGRAM TRACING: CPT

## 2021-11-23 PROCEDURE — 93010 EKG 12-LEAD: ICD-10-PCS | Mod: ,,, | Performed by: INTERNAL MEDICINE

## 2021-11-23 PROCEDURE — 99284 PR EMERGENCY DEPT VISIT,LEVEL IV: ICD-10-PCS | Mod: CS,,, | Performed by: EMERGENCY MEDICINE

## 2021-11-23 PROCEDURE — 96361 HYDRATE IV INFUSION ADD-ON: CPT

## 2021-11-23 RX ORDER — ALBUTEROL SULFATE 2.5 MG/.5ML
2.5 SOLUTION RESPIRATORY (INHALATION)
Status: COMPLETED | OUTPATIENT
Start: 2021-11-23 | End: 2021-11-23

## 2021-11-23 RX ORDER — NAPROXEN 375 MG/1
375 TABLET ORAL 2 TIMES DAILY PRN
Qty: 20 TABLET | Refills: 0 | Status: SHIPPED | OUTPATIENT
Start: 2021-11-23 | End: 2023-12-19

## 2021-11-23 RX ORDER — ALBUTEROL SULFATE 90 UG/1
2 AEROSOL, METERED RESPIRATORY (INHALATION) EVERY 6 HOURS PRN
Qty: 6.7 G | Refills: 0 | Status: SHIPPED | OUTPATIENT
Start: 2021-11-23 | End: 2023-12-19

## 2021-11-23 RX ORDER — BENZONATATE 100 MG/1
100 CAPSULE ORAL 3 TIMES DAILY PRN
Qty: 20 CAPSULE | Refills: 0 | Status: SHIPPED | OUTPATIENT
Start: 2021-11-23 | End: 2021-12-03

## 2021-11-23 RX ORDER — KETOROLAC TROMETHAMINE 30 MG/ML
10 INJECTION, SOLUTION INTRAMUSCULAR; INTRAVENOUS
Status: COMPLETED | OUTPATIENT
Start: 2021-11-23 | End: 2021-11-23

## 2021-11-23 RX ADMIN — ALBUTEROL SULFATE 2.5 MG: 2.5 SOLUTION RESPIRATORY (INHALATION) at 02:11

## 2021-11-23 RX ADMIN — KETOROLAC TROMETHAMINE 10 MG: 30 INJECTION, SOLUTION INTRAMUSCULAR at 02:11

## 2021-11-24 LAB
HCV AB SERPL QL IA: NEGATIVE
HIV 1+2 AB+HIV1 P24 AG SERPL QL IA: NEGATIVE

## 2022-02-18 DIAGNOSIS — Z12.31 OTHER SCREENING MAMMOGRAM: ICD-10-CM

## 2022-03-11 ENCOUNTER — PATIENT MESSAGE (OUTPATIENT)
Dept: ADMINISTRATIVE | Facility: HOSPITAL | Age: 47
End: 2022-03-11
Payer: OTHER GOVERNMENT

## 2022-03-21 ENCOUNTER — PATIENT MESSAGE (OUTPATIENT)
Dept: ADMINISTRATIVE | Facility: HOSPITAL | Age: 47
End: 2022-03-21
Payer: OTHER GOVERNMENT

## 2022-03-21 ENCOUNTER — PATIENT OUTREACH (OUTPATIENT)
Dept: ADMINISTRATIVE | Facility: HOSPITAL | Age: 47
End: 2022-03-21
Payer: OTHER GOVERNMENT

## 2022-03-21 DIAGNOSIS — Z12.11 SCREENING FOR COLON CANCER: ICD-10-CM

## 2022-08-24 ENCOUNTER — PATIENT MESSAGE (OUTPATIENT)
Dept: ADMINISTRATIVE | Facility: HOSPITAL | Age: 47
End: 2022-08-24
Payer: MEDICAID

## 2022-10-12 ENCOUNTER — PATIENT MESSAGE (OUTPATIENT)
Dept: ADMINISTRATIVE | Facility: HOSPITAL | Age: 47
End: 2022-10-12
Payer: MEDICAID

## 2023-04-03 ENCOUNTER — PATIENT MESSAGE (OUTPATIENT)
Dept: ADMINISTRATIVE | Facility: HOSPITAL | Age: 48
End: 2023-04-03
Payer: MEDICAID

## 2023-04-21 ENCOUNTER — HOSPITAL ENCOUNTER (EMERGENCY)
Facility: HOSPITAL | Age: 48
Discharge: HOME | End: 2023-04-21
Attending: EMERGENCY MEDICINE | Admitting: STUDENT IN AN ORGANIZED HEALTH CARE EDUCATION/TRAINING PROGRAM
Payer: MEDICAID

## 2023-04-21 ENCOUNTER — PATIENT MESSAGE (OUTPATIENT)
Dept: ADMINISTRATIVE | Facility: HOSPITAL | Age: 48
End: 2023-04-21
Payer: MEDICAID

## 2023-04-21 VITALS
DIASTOLIC BLOOD PRESSURE: 83 MMHG | HEART RATE: 50 BPM | SYSTOLIC BLOOD PRESSURE: 178 MMHG | TEMPERATURE: 98 F | RESPIRATION RATE: 20 BRPM | OXYGEN SATURATION: 99 %

## 2023-04-21 DIAGNOSIS — R17 TOTAL BILIRUBIN, ELEVATED: ICD-10-CM

## 2023-04-21 DIAGNOSIS — H54.61 VISION LOSS OF RIGHT EYE: ICD-10-CM

## 2023-04-21 DIAGNOSIS — H35.61 SUBHYALOID HEMORRHAGE OF RIGHT EYE: Primary | ICD-10-CM

## 2023-04-21 DIAGNOSIS — E87.6 HYPOKALEMIA: ICD-10-CM

## 2023-04-21 DIAGNOSIS — D69.6 THROMBOCYTOPENIA: ICD-10-CM

## 2023-04-21 LAB
ALBUMIN SERPL BCP-MCNC: 3.9 G/DL (ref 3.5–5.2)
ALP SERPL-CCNC: 39 U/L (ref 55–135)
ALT SERPL W/O P-5'-P-CCNC: 26 U/L (ref 10–44)
ANION GAP SERPL CALC-SCNC: 8 MMOL/L (ref 8–16)
AST SERPL-CCNC: 22 U/L (ref 10–40)
BASOPHILS # BLD AUTO: 0.01 K/UL (ref 0–0.2)
BASOPHILS NFR BLD: 0.2 % (ref 0–1.9)
BILIRUB SERPL-MCNC: 1.5 MG/DL (ref 0.1–1)
BUN SERPL-MCNC: 11 MG/DL (ref 6–20)
CALCIUM SERPL-MCNC: 8.9 MG/DL (ref 8.7–10.5)
CHLORIDE SERPL-SCNC: 110 MMOL/L (ref 95–110)
CO2 SERPL-SCNC: 23 MMOL/L (ref 23–29)
CREAT SERPL-MCNC: 0.8 MG/DL (ref 0.5–1.4)
CREAT SERPL-MCNC: 0.8 MG/DL (ref 0.5–1.4)
DIFFERENTIAL METHOD: ABNORMAL
EOSINOPHIL # BLD AUTO: 0.1 K/UL (ref 0–0.5)
EOSINOPHIL NFR BLD: 2.1 % (ref 0–8)
ERYTHROCYTE [DISTWIDTH] IN BLOOD BY AUTOMATED COUNT: 14.6 % (ref 11.5–14.5)
EST. GFR  (NO RACE VARIABLE): >60 ML/MIN/1.73 M^2
GLUCOSE SERPL-MCNC: 97 MG/DL (ref 70–110)
HCT VFR BLD AUTO: 30.4 % (ref 37–48.5)
HCV AB SERPL QL IA: NEGATIVE
HGB BLD-MCNC: 10.8 G/DL (ref 12–16)
HIV 1+2 AB+HIV1 P24 AG SERPL QL IA: NEGATIVE
IMM GRANULOCYTES # BLD AUTO: 0.01 K/UL (ref 0–0.04)
IMM GRANULOCYTES NFR BLD AUTO: 0.2 % (ref 0–0.5)
INR PPP: 1 (ref 0.8–1.2)
LYMPHOCYTES # BLD AUTO: 1.5 K/UL (ref 1–4.8)
LYMPHOCYTES NFR BLD: 35.3 % (ref 18–48)
MAGNESIUM SERPL-MCNC: 2 MG/DL (ref 1.6–2.6)
MCH RBC QN AUTO: 27.9 PG (ref 27–31)
MCHC RBC AUTO-ENTMCNC: 35.5 G/DL (ref 32–36)
MCV RBC AUTO: 79 FL (ref 82–98)
MONOCYTES # BLD AUTO: 0.3 K/UL (ref 0.3–1)
MONOCYTES NFR BLD: 6.3 % (ref 4–15)
NEUTROPHILS # BLD AUTO: 2.4 K/UL (ref 1.8–7.7)
NEUTROPHILS NFR BLD: 55.9 % (ref 38–73)
NRBC BLD-RTO: 0 /100 WBC
PLATELET # BLD AUTO: 100 K/UL (ref 150–450)
PMV BLD AUTO: 9.4 FL (ref 9.2–12.9)
POCT GLUCOSE: 108 MG/DL (ref 70–110)
POTASSIUM SERPL-SCNC: 3.4 MMOL/L (ref 3.5–5.1)
PROT SERPL-MCNC: 6.6 G/DL (ref 6–8.4)
PROTHROMBIN TIME: 10.9 SEC (ref 9–12.5)
RBC # BLD AUTO: 3.87 M/UL (ref 4–5.4)
SAMPLE: NORMAL
SODIUM SERPL-SCNC: 141 MMOL/L (ref 136–145)
TSH SERPL DL<=0.005 MIU/L-ACNC: 2.94 UIU/ML (ref 0.4–4)
WBC # BLD AUTO: 4.3 K/UL (ref 3.9–12.7)

## 2023-04-21 PROCEDURE — 99284 EMERGENCY DEPT VISIT MOD MDM: CPT | Mod: ,,, | Performed by: OPHTHALMOLOGY

## 2023-04-21 PROCEDURE — 85610 PROTHROMBIN TIME: CPT | Performed by: EMERGENCY MEDICINE

## 2023-04-21 PROCEDURE — 82962 GLUCOSE BLOOD TEST: CPT

## 2023-04-21 PROCEDURE — 85660 RBC SICKLE CELL TEST: CPT | Performed by: EMERGENCY MEDICINE

## 2023-04-21 PROCEDURE — 83020 HEMOGLOBIN ELECTROPHORESIS: CPT | Performed by: EMERGENCY MEDICINE

## 2023-04-21 PROCEDURE — 25500020 PHARM REV CODE 255: Performed by: EMERGENCY MEDICINE

## 2023-04-21 PROCEDURE — 82565 ASSAY OF CREATININE: CPT | Mod: 59

## 2023-04-21 PROCEDURE — 99284 PR EMERGENCY DEPT VISIT,LEVEL IV: ICD-10-PCS | Mod: ,,, | Performed by: OPHTHALMOLOGY

## 2023-04-21 PROCEDURE — 36415 COLL VENOUS BLD VENIPUNCTURE: CPT | Performed by: EMERGENCY MEDICINE

## 2023-04-21 PROCEDURE — 99285 EMERGENCY DEPT VISIT HI MDM: CPT | Mod: ,,, | Performed by: EMERGENCY MEDICINE

## 2023-04-21 PROCEDURE — 84443 ASSAY THYROID STIM HORMONE: CPT | Performed by: EMERGENCY MEDICINE

## 2023-04-21 PROCEDURE — 87389 HIV-1 AG W/HIV-1&-2 AB AG IA: CPT | Performed by: EMERGENCY MEDICINE

## 2023-04-21 PROCEDURE — 80053 COMPREHEN METABOLIC PANEL: CPT | Performed by: EMERGENCY MEDICINE

## 2023-04-21 PROCEDURE — 81269 HBA1/HBA2 GENE DUP/DEL VRNTS: CPT | Performed by: EMERGENCY MEDICINE

## 2023-04-21 PROCEDURE — 96360 HYDRATION IV INFUSION INIT: CPT

## 2023-04-21 PROCEDURE — 86803 HEPATITIS C AB TEST: CPT | Performed by: EMERGENCY MEDICINE

## 2023-04-21 PROCEDURE — 25000003 PHARM REV CODE 250: Performed by: EMERGENCY MEDICINE

## 2023-04-21 PROCEDURE — 85025 COMPLETE CBC W/AUTO DIFF WBC: CPT | Performed by: EMERGENCY MEDICINE

## 2023-04-21 PROCEDURE — 83735 ASSAY OF MAGNESIUM: CPT | Performed by: EMERGENCY MEDICINE

## 2023-04-21 PROCEDURE — 99284 EMERGENCY DEPT VISIT MOD MDM: CPT | Mod: 25

## 2023-04-21 PROCEDURE — 99285 PR EMERGENCY DEPT VISIT,LEVEL V: ICD-10-PCS | Mod: ,,, | Performed by: EMERGENCY MEDICINE

## 2023-04-21 PROCEDURE — 96361 HYDRATE IV INFUSION ADD-ON: CPT

## 2023-04-21 RX ORDER — SODIUM CHLORIDE 9 MG/ML
1000 INJECTION, SOLUTION INTRAVENOUS
Status: COMPLETED | OUTPATIENT
Start: 2023-04-21 | End: 2023-04-21

## 2023-04-21 RX ORDER — ASPIRIN 81 MG/1
81 TABLET ORAL DAILY
COMMUNITY
End: 2023-08-28 | Stop reason: CLARIF

## 2023-04-21 RX ORDER — OXYCODONE AND ACETAMINOPHEN 5; 325 MG/1; MG/1
1 TABLET ORAL
Status: COMPLETED | OUTPATIENT
Start: 2023-04-21 | End: 2023-04-21

## 2023-04-21 RX ORDER — PROPARACAINE HYDROCHLORIDE 5 MG/ML
2 SOLUTION/ DROPS OPHTHALMIC
Status: COMPLETED | OUTPATIENT
Start: 2023-04-21 | End: 2023-04-21

## 2023-04-21 RX ORDER — HYDROCODONE BITARTRATE AND ACETAMINOPHEN 5; 325 MG/1; MG/1
1 TABLET ORAL EVERY 8 HOURS PRN
Qty: 6 TABLET | Refills: 0 | Status: SHIPPED | OUTPATIENT
Start: 2023-04-21 | End: 2023-04-24

## 2023-04-21 RX ORDER — ENALAPRILAT 1.25 MG/ML
1.25 INJECTION INTRAVENOUS ONCE
Status: DISCONTINUED | OUTPATIENT
Start: 2023-04-21 | End: 2023-04-21 | Stop reason: HOSPADM

## 2023-04-21 RX ADMIN — IOHEXOL 100 ML: 350 INJECTION, SOLUTION INTRAVENOUS at 05:04

## 2023-04-21 RX ADMIN — SODIUM CHLORIDE 1000 ML: 9 INJECTION, SOLUTION INTRAVENOUS at 06:04

## 2023-04-21 RX ADMIN — OXYCODONE HYDROCHLORIDE AND ACETAMINOPHEN 1 TABLET: 5; 325 TABLET ORAL at 07:04

## 2023-04-21 RX ADMIN — PROPARACAINE HYDROCHLORIDE 2 DROP: 5 SOLUTION/ DROPS OPHTHALMIC at 06:04

## 2023-04-21 NOTE — ED NOTES
Dr. Calvert at bedside w/ Tonopen, pressure 20.  Right eye 6-7mm (pt took OTC eye medicine that dilated eye) and 3mm to left eye.  Pt reports 9/10 headache

## 2023-04-21 NOTE — CONSULTS
"Consultation Report  Ophthalmology Service    Date: 04/21/2023    Chief complaint/Reason for Consult: "Vision loss to right eye.  Concern for retinal detachment.  Transferred from outside facility"     History of Present Illness: Rakel Mims is a 48 y.o. female with PMHx of Sickle Cell Trait who presents with right eye periorbital pain and vision loss in the right eye. Patient reports 2 days of floaters, 1 day of R eye vision loss, and few hours of right eye pain. States that each symptom began suddenly and progressively worsened to the point where she can only see shadows in her right eye. States the center of her vision is black and surrounded by red in the periphery. Denies recent trauma, recent illness, pain w/ eye movement, diplopia, drainage, irritation, photophobia, flashes, or curtains/veils over vision.     POcularHx: Denies history of ocular problems or past ocular surgeries.    Current eye gtts: Denies     Family Hx: Denies family history of glaucoma, macular degeneration, or blindness. family history includes ADD / ADHD in her daughter and son; Heart failure in her father; Hypertension in her father, mother, and sister; No Known Problems in her brother, maternal aunt, maternal grandfather, maternal grandmother, maternal uncle, paternal aunt, paternal grandfather, paternal grandmother, and paternal uncle; Ulcers in her father.     PMHx:  has a past medical history of Bipolar 1 disorder, Hypertension, Iron deficiency anemia (6/4/2018), and Sickle cell trait.     PSurgHx:  has a past surgical history that includes Cholecystectomy; Tubal ligation; Hysteroscopy with dilation and curettage of uterus (N/A, 9/10/2018); Ablation of endometrium using resectoscope (9/10/2018); and Dilation and curettage of uterus (9/10/2018).     Home Medications:   Prior to Admission medications    Medication Sig Start Date End Date Taking? Authorizing Provider   aspirin (ECOTRIN) 81 MG EC tablet Take 81 mg by mouth once " daily.   Yes Historical Provider   propranoloL (INDERAL) 20 MG tablet Take 1 tablet by mouth 3 (three) times daily. 6/14/21  Yes Historical Provider   albuterol (PROVENTIL/VENTOLIN HFA) 90 mcg/actuation inhaler Inhale 2 puffs into the lungs every 6 (six) hours as needed for Wheezing or Shortness of Breath. 11/23/21 11/23/22  New Kessler MD   ALPRAZolam (XANAX) 0.5 MG tablet Take 1 tablet (0.5 mg total) by mouth nightly as needed for Insomnia or Anxiety. 7/19/21   Radha Rocha MD   doxepin (SINEQUAN) 25 MG capsule Take 1 capsule (25 mg total) by mouth every evening. 8/17/21 10/16/21  Didi Hogan MD   gabapentin (NEURONTIN) 100 MG capsule Take 1 capsule by mouth 3 (three) times daily. 6/14/21   Historical Provider   hydroCHLOROthiazide (HYDRODIURIL) 25 MG tablet Take 25 mg by mouth. 3/10/21   Historical Provider   HYDROcodone-acetaminophen (NORCO) 5-325 mg per tablet Take 1 tablet by mouth every 8 (eight) hours as needed for Pain (Not relieved by over-the-counter acetaminophen.  Do not take more than 4000 mg of acetaminophen in a day). 4/21/23 4/24/23  Charan Ku MD   losartan (COZAAR) 50 MG tablet Take 1 tablet by mouth once daily. 6/24/21   Historical Provider   meclizine (ANTIVERT) 25 mg tablet Take 25 mg by mouth. 3/19/21   Historical Provider   naproxen (NAPROSYN) 375 MG tablet Take 1 tablet (375 mg total) by mouth 2 (two) times daily as needed (pain). 11/23/21   New Kessler MD   ondansetron (ZOFRAN) 4 MG tablet Take 1 tablet (4 mg total) by mouth every 6 (six) hours as needed for Nausea. 2/8/19   Radha Rocha MD        Medications this encounter:         Allergies: is allergic to reglan [metoclopramide hcl].     Social:  reports that she has never smoked. She has never used smokeless tobacco. She reports current alcohol use. She reports that she does not use drugs.     ROS: As per HPI    Ocular examination/Dilated fundus examination:  Base Eye Exam       Visual Acuity (Snellen -  Linear)         Right Left    Dist sc LP 20/20              Tonometry (Tonopen, 11:11 AM)         Right Left    Pressure 21 20              Pupils         Dark Light Shape React APD    Right 4 3 Round Brisk None    Left 4 3 Round Brisk None              Visual Fields         Right Left      Full              Extraocular Movement         Right Left     Full, Ortho Full, Ortho              Neuro/Psych       Oriented x3: Yes    Mood/Affect: Normal              Dilation       Both eyes: 1% Mydriacyl, 2.5% Phenylephrine @ 11:11 AM                  Slit Lamp and Fundus Exam       External Exam         Right Left    External Normal Normal              Slit Lamp Exam         Right Left    Lids/Lashes Normal Normal    Conjunctiva/Sclera White and quiet White and quiet    Cornea Clear Clear    Anterior Chamber Deep and quiet Deep and quiet    Iris Round and reactive Round and reactive    Lens NSC NSC    Anterior Vitreous Subhyaloid hemorrhage Old VH              Fundus Exam         Right Left    Disc no view Pink & sharp    C/D Ratio 0.3 0.3    Macula Subhyaloid hemorrhage overlying macula Flat    Vessels Poor view - Attenuated Attenuated    Periphery Large subhyaloid hemorrhage with areas in SN periphery flat anterior to heme Peripheral areas of regressed NV/infarctions and pigmentary changes, sea fan NV temp, old heme inf                      Assessment/Plan:     1. Proliferative Sickle Cell Retinopathy, OU   2. Subhyaloid Hemorrhage, OD   - Patient states that 2 days ago she noticed an increase in floaters then last night experienced sudden vision loss in the right eye.  - VA LP // 20/20, IOP wnl,  PERRL no APD, EOMI  - Anterior exam unremarkable OU   - DFE (and  by Rosa) reveal large subhyaloid hemorrhage that obscures majority of view to retina but confirmed flat underneath hemorrhage and anterior to heme by Dr Lee with scleral depressed exam   - Discussed findings w/ patient, and discussed that she will need close  follow up in Retina clinic for management of proliferative SCR, patient amenable to plan   - RD/Return precautions discussed     Recommendations:  - Thalassemia and Hemoglobinopathy workup   - Follow up in retina clinic early next week for YUKI OD and possible PRP OS    Patient discussed w/ and seen by Dr Lee.    Patient's Best Contact Number: [phone]     Noe Youssef MD  [facility] Ophthalmology, PGY-2  04/21/2023  1:42 PM

## 2023-04-21 NOTE — ED PROVIDER NOTES
"Encounter Date: 4/21/2023    SCRIBE #1 NOTE: I, Malaika Jennings, am scribing for, and in the presence of,  Joesph Lovell MD. I have scribed the following portions of the note - Other sections scribed: HPI, ROS, PE.     History     Chief Complaint   Patient presents with    Blindness     Pt c/o R eye blindness x1day pt states "it began with a black spot in my vision I thought it was a floater but now I can't see anything pt reports HA that began 2 hours ago with associated numbness and tingling to R side of head.     Time seen by provider: 6:32 AM    This is a 48 y.o. female who presents with complaint of sudden right-sided blindness beginning two days ago without preceding trauma. Patient described seeing "floaties" which multiplied and turned into a glob which then dispersed and covered her vision. Since then, her vision has been black on that side. She experienced right facial and head numbness and tingling above the level of the eyebrow yesterday. She began to experience pain to the eye yesterday, approximately 10.5 hours ago. Today she reports dizziness and photophobia. Patient has been applying OTC eye drops without relief. PMHx of HTN on propanolol. No new medications. This is the extent of the patient's complaints at this time.    The history is provided by the patient.   Review of patient's allergies indicates:   Allergen Reactions    Reglan [metoclopramide hcl] Hallucinations     Past Medical History:   Diagnosis Date    Bipolar 1 disorder     Hypertension     Iron deficiency anemia 6/4/2018    Sickle cell trait      Past Surgical History:   Procedure Laterality Date    ABLATION OF ENDOMETRIUM USING RESECTOSCOPE  9/10/2018    CHOLECYSTECTOMY      DILATION AND CURETTAGE OF UTERUS  9/10/2018    HYSTEROSCOPY WITH DILATION AND CURETTAGE OF UTERUS N/A 9/10/2018    TUBAL LIGATION       Family History   Problem Relation Age of Onset    Hypertension Mother     Heart failure Father     " Hypertension Father     Ulcers Father     Hypertension Sister     ADD / ADHD Daughter     ADD / ADHD Son     No Known Problems Brother     No Known Problems Maternal Aunt     No Known Problems Maternal Uncle     No Known Problems Paternal Aunt     No Known Problems Paternal Uncle     No Known Problems Maternal Grandmother     No Known Problems Maternal Grandfather     No Known Problems Paternal Grandmother     No Known Problems Paternal Grandfather     Breast cancer Neg Hx     Colon cancer Neg Hx     Ovarian cancer Neg Hx     Amblyopia Neg Hx     Blindness Neg Hx     Cancer Neg Hx     Cataracts Neg Hx     Diabetes Neg Hx     Glaucoma Neg Hx     Macular degeneration Neg Hx     Retinal detachment Neg Hx     Strabismus Neg Hx     Stroke Neg Hx     Thyroid disease Neg Hx      Social History     Tobacco Use    Smoking status: Never    Smokeless tobacco: Never   Substance Use Topics    Alcohol use: Yes     Comment: occasionally    Drug use: No     Review of Systems   Constitutional:  Negative for chills and fever.   HENT:  Negative for congestion and sore throat.    Eyes:  Positive for photophobia, pain and visual disturbance. Negative for redness.   Respiratory:  Negative for cough and shortness of breath.    Cardiovascular:  Negative for chest pain.   Gastrointestinal:  Negative for abdominal pain, nausea and vomiting.   Genitourinary:  Negative for dysuria.   Musculoskeletal:  Negative for back pain.   Skin:  Negative for rash.   Neurological:  Positive for dizziness and numbness. Negative for weakness, light-headedness and headaches.   Psychiatric/Behavioral:  Negative for confusion.      Physical Exam     Initial Vitals [04/21/23 0511]   BP Pulse Resp Temp SpO2   (!) 157/80 (!) 56 16 97.8 °F (36.6 °C) 97 %      MAP       --         Physical Exam    Nursing note and vitals reviewed.  Constitutional: She appears well-developed and well-nourished. She is not diaphoretic. No distress.    HENT:   Head: Normocephalic and atraumatic.   Mouth/Throat: Oropharynx is clear and moist and mucous membranes are normal.   Eyes: Conjunctivae and EOM are normal. Pupils are equal, round, and reactive to light. No scleral icterus.   Right eye is dilated to 6 mm and left 3 mm (patient used dilating drops). Suspected retinal hemorrhage visualized. Intraocular pressure 20 mmHg on the right.   Neck: Neck supple.   Normal range of motion.  Cardiovascular:  Normal rate, regular rhythm, S1 normal and S2 normal.           No murmur heard.  Pulmonary/Chest: Breath sounds normal. No respiratory distress.   Musculoskeletal:         General: No tenderness or edema. Normal range of motion.      Cervical back: Normal range of motion and neck supple.     Lymphadenopathy:     She has no cervical adenopathy.   Neurological: She is alert and oriented to person, place, and time. She has normal strength. No cranial nerve deficit.   Cranial nerves II-XII intact. Strength 5/5 throughout. Sensation intact to light touch throughout. No papilledema.  No meningeal signs.   Skin: Skin is warm and dry. Capillary refill takes less than 2 seconds. No rash noted. No pallor.   Psychiatric: She has a normal mood and affect. Thought content normal.       ED Course   Procedures  Labs Reviewed   CBC W/ AUTO DIFFERENTIAL - Abnormal; Notable for the following components:       Result Value    RBC 3.87 (*)     Hemoglobin 10.8 (*)     Hematocrit 30.4 (*)     MCV 79 (*)     RDW 14.6 (*)     Platelets 100 (*)     All other components within normal limits   COMPREHENSIVE METABOLIC PANEL - Abnormal; Notable for the following components:    Potassium 3.4 (*)     Total Bilirubin 1.5 (*)     Alkaline Phosphatase 39 (*)     All other components within normal limits   MAGNESIUM   PROTIME-INR   TSH   HEPATITIS C ANTIBODY   HIV 1 / 2 ANTIBODY   HIV 1 / 2 ANTIBODY   HEPATITIS C ANTIBODY   POCT GLUCOSE   ISTAT CREATININE          Imaging Results              CTA  Head and Neck (xpd) (Final result)  Result time 04/21/23 06:16:30      Final result by Yomi Scott MD (04/21/23 06:16:30)                   Impression:      1. No acute intracranial findings.  2. No evidence of acute large vessel occlusion or flow-limiting stenosis.  3. Additional details, as provided in the body of report.      Electronically signed by: Yomi Scott  Date:    04/21/2023  Time:    06:16               Narrative:    EXAMINATION:  CTA HEAD AND NECK (XPD)    CLINICAL HISTORY:  Vision loss, monocular;    TECHNIQUE:  Non contrast low dose axial images were obtained through the head.  CT angiogram was performed from the level of the wesley to the top of the head following the IV administration of 100mL of Omnipaque 350.   Sagittal and coronal reconstructions and maximum intensity projection reconstructions were performed. Arterial stenosis percentages are based on NASCET measurement criteria.    COMPARISON:  CT head 03/26/2013    FINDINGS:  CT HEAD:    Blood: No acute intracranial hemorrhage.    Parenchyma: No definite loss of gray-white differentiation to suggest acute or subacute transcortical infarct.    Ventricles/Extra-axial spaces: No abnormal extra-axial fluid collection. Basal cisterns are patent.    Vessels: See below.    Orbits: Unremarkable.    Scalp: Unremarkable.    Skull: There are no depressed skull fractures or destructive bone lesions.    Sinuses and mastoids: Scattered relatively modest paranasal sinus mucosal thickening.    Other findings: Partially empty sella configuration.    CTA:    NECK:    Imaged aortic arch: Nonaneurysmal.  Left-sided arch.  Minimal atherosclerotic calcification.  Conventional 3 vessel arch anatomy.  Brachiocephalic and subclavian arteries patent.    Right common and cervical internal carotid arteries: Normal    Left common and cervical internal carotid arteries: Normal    Right cervical vertebral artery: There is no hemodynamically significant stenosis  or dissection    Left cervical vertebral artery: There is no hemodynamically significant stenosis or dissection.    HEAD:    Right anterior circulation:Normal Right ophthalmic artery is patent.    Left anterior circulation: NormalLeft ophthalmic artery is patent.    Posterior circulation: There is no hemodynamically significant vertebrobasilar stenosis. There is no significant PCA stenosis.    Anterior and posterior communicating arteries: The anterior and posterior communicating arteries are visualized.    NON-ANGIOGRAPHIC FINDINGS:    Visualized intracranial contents: As above.    Soft tissues of the neck: Unremarkable.    Visualized sinuses: As above.    Dentition: Unremarkable.    Spine: Unremarkable.    Visualized lungs: Clear.                                       Medications   0.9%  NaCl infusion (1,000 mLs Intravenous New Bag 4/21/23 0604)   iohexoL (OMNIPAQUE 350) injection 100 mL (100 mLs Intravenous Given 4/21/23 3531)   proparacaine 0.5 % ophthalmic solution 2 drop (2 drops Right Eye Given by Provider 4/21/23 6894)   oxyCODONE-acetaminophen 5-325 mg per tablet 1 tablet (1 tablet Oral Given 4/21/23 8848)     Medical Decision Making:   History:   Old Medical Records: I decided to obtain old medical records.  Clinical Tests:   Lab Tests: Ordered and Reviewed  Radiological Study: Ordered and Reviewed        Scribe Attestation:   Scribe #1: I performed the above scribed service and the documentation accurately describes the services I performed. I attest to the accuracy of the note.    Attending Attestation:             Attending ED Notes:   Emergent evaluation of 48-year-old female with nontraumatic loss of vision to her right eye this past Wednesday.  Patient began developing pain to the right eye yesterday at approximately 8:00 p.m..  Patient states today she had episodes of dizziness which caused her to come into the emergency room.  Patient is afebrile, nontoxic-appearing stable vital signs except for  elevation of blood pressure.  Patient is neurovascularly intact without focal neurologic deficits. Cranial nerves II-XII intact. Strength 5/5 throughout. Sensation intact to light touch throughout. Good finger to nose task ability. Negative pronator drift. Two point discrimination is intact throughout. Reflexes 2+ throughout. No papilledema.  No meningeal signs. PERRLA. EOMI.  No acute findings on CTA of the head neck.  Patient has no elevation white blood cell count.  H&H 10.8 and 30.4.  Platelet count is 100.  On CMP potassium is 3.4.  Total bili is 1.5.  Patient's visual acuity is 20/13 left eye and loss of vision in the right eye.  Favian-Pen pressure of right eye is 20.  PERRLA.  EOMI.  No corneal abrasion.  No corneal ulceration.  No hyphema.  Patient's right pupil is dilated at 6-7 mm and not reactive.  Patient's  said he put drops into the right eye which caused dilatation of the right pupil. He cannot recall the name of the drops he had put in.  Given my examination I do suspect patient has either retinal hemorrhage or vitreous hemorrhage.  I consulted and discussed patient with ophthalmologist on-call who has accepted the patient for transferred to [facility] for further evaluation and treatment.  The patient is extensively counseled on their diagnosis and treatment and transferred to [facility] in stable condition.Physician Attestation for Scribe: I, Joesph Lovell, reviewed documentation as scribed in my presence, which is both accurate and complete.        ED Course as of 06/07/23 1950 Fri Apr 21, 2023   0651 Consulted and discussed with Dr. Rafal Ding (Ophthalmology). Agrees with plan and recommends ED to ED transfer to [facility] for further evaluation.  [GS]   1207 Patient seen and evaluated by ophthalmology at bedside.  Again concern for significant retinal pathology.  They request blood pressure management prior to evaluation in retina clinic. [DS]   1240  Patient was evaluated by ophthalmology staff with myself at bedside.  Presentation concerning for subhyaloid hemorrhage secondary to the patient's sickle cell trait.  Ophthalmology will see the patient in clinic for laser therapy.  Will send thalassemia/hemoglobinopathy panel at their recommendation.  Patient given retina precautions and discharged to follow up in ophthalmology clinic [DS]      ED Course User Index  [DS] Charan Ku MD  [GS] Malaika Jennings                 Clinical Impression:   Final diagnoses:  [H35.61] Retinal hemorrhage of right eye (Primary)  [H54.61] Vision loss of right eye  [E87.6] Hypokalemia  [R17] Total bilirubin, elevated  [D69.6] Thrombocytopenia        ED Disposition Condition    Transfer to Another Facility Stable                Joesph Campbell MD  04/21/23 0732       Joesph Campbell MD  06/07/23 1950     68

## 2023-04-21 NOTE — ED PROVIDER NOTES
"Encounter Date: 4/21/2023       History     Chief Complaint   Patient presents with    Blindness     Pt c/o R eye blindness x1day pt states "it began with a black spot in my vision I thought it was a floater but now I can't see anything pt reports HA that began 2 hours ago with associated numbness and tingling to R side of head.    Transfer     Arrives via EMS from [facility] here for opthalmology consult      40-year-old female presents from outside facility for vision loss to right eye.  Patient was experiencing some floaters prior to this.  She used her 's mydriatic eyedrops with a red top at about 8:00 p.m. last night.  This did not relieve her symptoms.  She was evaluated in outside facility to include stroke evaluation for CRAO/CRVO.   Presentation was felt to most concerning for retinal and/or vitreous pathology and she was transferred this facility for ophthalmologic consultation.  My evaluation, the patient denies any pain to the affected eye.  Chart review shows that her intra-ocular pressure was 20 at the outside facility.  She did receive mydriatic last night.  She continues to report no vision to the right eye.    Review of patient's allergies indicates:   Allergen Reactions    Reglan [metoclopramide hcl] Hallucinations     Past Medical History:   Diagnosis Date    Bipolar 1 disorder     Hypertension     Iron deficiency anemia 6/4/2018    Sickle cell trait      Past Surgical History:   Procedure Laterality Date    ABLATION OF ENDOMETRIUM USING RESECTOSCOPE  9/10/2018    CHOLECYSTECTOMY      DILATION AND CURETTAGE OF UTERUS  9/10/2018    HYSTEROSCOPY WITH DILATION AND CURETTAGE OF UTERUS N/A 9/10/2018    TUBAL LIGATION       Family History   Problem Relation Age of Onset    Hypertension Mother     Heart failure Father     Hypertension Father     Ulcers Father     Hypertension Sister     ADD / ADHD Daughter     ADD / ADHD Son     No Known Problems Brother     No Known Problems Maternal Aunt     No " Known Problems Maternal Uncle     No Known Problems Paternal Aunt     No Known Problems Paternal Uncle     No Known Problems Maternal Grandmother     No Known Problems Maternal Grandfather     No Known Problems Paternal Grandmother     No Known Problems Paternal Grandfather     Breast cancer Neg Hx     Colon cancer Neg Hx     Ovarian cancer Neg Hx     Amblyopia Neg Hx     Blindness Neg Hx     Cancer Neg Hx     Cataracts Neg Hx     Diabetes Neg Hx     Glaucoma Neg Hx     Macular degeneration Neg Hx     Retinal detachment Neg Hx     Strabismus Neg Hx     Stroke Neg Hx     Thyroid disease Neg Hx      Social History     Tobacco Use    Smoking status: Never    Smokeless tobacco: Never   Substance Use Topics    Alcohol use: Yes     Comment: occasionally    Drug use: No     Review of Systems  All other systems reviewed and negative except as noted in HPI    Physical Exam     Initial Vitals [04/21/23 0511]   BP Pulse Resp Temp SpO2   (!) 157/80 (!) 56 16 97.8 °F (36.6 °C) 97 %      MAP       --         Physical Exam    Constitutional: She appears well-developed and well-nourished. She is not diaphoretic. No distress.   HENT:   Head: Normocephalic and atraumatic.   Eyes: Right eye exhibits no discharge. Left eye exhibits no discharge. No scleral icterus.   Right pupil 7 mm unresponsive   No perception of light to right eye   No direct or consensual photophobia   Extraocular movements intact   No significant conjunctival injection on right   Significant hemorrhage seen on funduscopic exam on right.   Neck: Neck supple.   Normal range of motion.  Cardiovascular:  Normal rate, regular rhythm, normal heart sounds and intact distal pulses.           Pulmonary/Chest: Breath sounds normal. No respiratory distress.   Abdominal: Abdomen is soft. She exhibits no distension.   Musculoskeletal:         General: No tenderness or edema. Normal range of motion.      Cervical back: Normal range of motion and neck supple.     Neurological:  She is alert and oriented to person, place, and time. She has normal strength and normal reflexes. A cranial nerve deficit (afferent/efferent pupillary defect on right) is present.   Skin: Skin is warm and dry. Capillary refill takes less than 2 seconds.       ED Course   Procedures  Labs Reviewed   CBC W/ AUTO DIFFERENTIAL - Abnormal; Notable for the following components:       Result Value    RBC 3.87 (*)     Hemoglobin 10.8 (*)     Hematocrit 30.4 (*)     MCV 79 (*)     RDW 14.6 (*)     Platelets 100 (*)     All other components within normal limits   COMPREHENSIVE METABOLIC PANEL - Abnormal; Notable for the following components:    Potassium 3.4 (*)     Total Bilirubin 1.5 (*)     Alkaline Phosphatase 39 (*)     All other components within normal limits   MAGNESIUM   PROTIME-INR   TSH   HEPATITIS C ANTIBODY   HIV 1 / 2 ANTIBODY   HIV 1 / 2 ANTIBODY   HEPATITIS C ANTIBODY   THALASSEIMA AND HEMOGLOBINOPATHY EVALUATION   POCT GLUCOSE   ISTAT CREATININE          Imaging Results              CTA Head and Neck (xpd) (Final result)  Result time 04/21/23 06:16:30      Final result by Yomi Scott MD (04/21/23 06:16:30)                   Impression:      1. No acute intracranial findings.  2. No evidence of acute large vessel occlusion or flow-limiting stenosis.  3. Additional details, as provided in the body of report.      Electronically signed by: Yomi Scott  Date:    04/21/2023  Time:    06:16               Narrative:    EXAMINATION:  CTA HEAD AND NECK (XPD)    CLINICAL HISTORY:  Vision loss, monocular;    TECHNIQUE:  Non contrast low dose axial images were obtained through the head.  CT angiogram was performed from the level of the wesley to the top of the head following the IV administration of 100mL of Omnipaque 350.   Sagittal and coronal reconstructions and maximum intensity projection reconstructions were performed. Arterial stenosis percentages are based on NASCET measurement  criteria.    COMPARISON:  CT head 03/26/2013    FINDINGS:  CT HEAD:    Blood: No acute intracranial hemorrhage.    Parenchyma: No definite loss of gray-white differentiation to suggest acute or subacute transcortical infarct.    Ventricles/Extra-axial spaces: No abnormal extra-axial fluid collection. Basal cisterns are patent.    Vessels: See below.    Orbits: Unremarkable.    Scalp: Unremarkable.    Skull: There are no depressed skull fractures or destructive bone lesions.    Sinuses and mastoids: Scattered relatively modest paranasal sinus mucosal thickening.    Other findings: Partially empty sella configuration.    CTA:    NECK:    Imaged aortic arch: Nonaneurysmal.  Left-sided arch.  Minimal atherosclerotic calcification.  Conventional 3 vessel arch anatomy.  Brachiocephalic and subclavian arteries patent.    Right common and cervical internal carotid arteries: Normal    Left common and cervical internal carotid arteries: Normal    Right cervical vertebral artery: There is no hemodynamically significant stenosis or dissection    Left cervical vertebral artery: There is no hemodynamically significant stenosis or dissection.    HEAD:    Right anterior circulation:Normal Right ophthalmic artery is patent.    Left anterior circulation: NormalLeft ophthalmic artery is patent.    Posterior circulation: There is no hemodynamically significant vertebrobasilar stenosis. There is no significant PCA stenosis.    Anterior and posterior communicating arteries: The anterior and posterior communicating arteries are visualized.    NON-ANGIOGRAPHIC FINDINGS:    Visualized intracranial contents: As above.    Soft tissues of the neck: Unremarkable.    Visualized sinuses: As above.    Dentition: Unremarkable.    Spine: Unremarkable.    Visualized lungs: Clear.                                       Medications   enalaprilat injection 1.25 mg (has no administration in time range)   0.9%  NaCl infusion (0 mLs Intravenous Stopped  4/21/23 7611)   iohexoL (OMNIPAQUE 350) injection 100 mL (100 mLs Intravenous Given 4/21/23 7755)   proparacaine 0.5 % ophthalmic solution 2 drop (2 drops Right Eye Given by Provider 4/21/23 7624)   oxyCODONE-acetaminophen 5-325 mg per tablet 1 tablet (1 tablet Oral Given 4/21/23 4870)     Medical Decision Making:   History:   I obtained history from: someone other than patient and EMS provider.       <> Summary of History: No significant changes in route  Old Medical Records: I decided to obtain old medical records.  Old Records Summarized: records from clinic visits, records from previous admission(s) and records from another hospital.       <> Summary of Records: I reviewed the patient's chart and imaging from the outside facility to include no evidence of CVA on CTA head and neck.  IOP 20 on right  Initial Assessment:   History of floaters prior to onset of vision loss is concerning for retinal detachment or other retinal or vitreous pathology.  Bedside ultrasound shows hyperechoic material and concern for vitreous detachment on the right.  Funduscopic exam likewise concerning for retinal or vitreous hemorrhage.  Ophthalmology consult on arrival.  Awaiting recommendations  Independently Interpreted Test(s):   I have ordered and independently interpreted X-rays - see summary below.       <> Summary of X-Ray Reading(s): Posterior hemorrhage to right eye on bedside ultrasound  Clinical Tests:   Lab Tests: Ordered and Reviewed  Radiological Study: Ordered and Reviewed  ED Management:  See ED course for additional HPI, ROS, PE, lab, imaging, consultant discussion, procedure interpretation, and Medical Decision Making.    Other:   I have discussed this case with another health care provider.       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  Patient will follow-up in clinic for laser therapy.  Will  send thalassemia/hemoglobinopathy panel from emergency department.           ED Course as of 04/21/23 1245   Fri Apr 21, 2023   0661 Consulted and discussed with Dr. Rafal Ding (Ophthalmology). Agrees with plan and recommends ED to ED transfer to [facility] for further evaluation.  [GS]   1207 Patient seen and evaluated by ophthalmology at bedside.  Again concern for significant retinal pathology.  They request blood pressure management prior to evaluation in retina clinic. [DS]   1240 Patient was evaluated by ophthalmology staff with myself at bedside.  Presentation concerning for subhyaloid hemorrhage secondary to the patient's sickle cell trait.  Ophthalmology will see the patient in clinic for laser therapy.  Will send thalassemia/hemoglobinopathy panel at their recommendation.  Patient given retina precautions and discharged to follow up in ophthalmology clinic [DS]      ED Course User Index  [DS] Charan Ku MD  [GS] Malaika Jennings                 Clinical Impression:   Final diagnoses:  [H54.61] Vision loss of right eye  [E87.6] Hypokalemia  [R17] Total bilirubin, elevated  [D69.6] Thrombocytopenia  [H35.61] Subhyaloid hemorrhage of right eye (Primary)        ED Disposition Condition    Transfer to Another Facility Stable                Charan Ku MD  04/21/23 1241

## 2023-04-21 NOTE — FIRST PROVIDER EVALUATION
Medical screening examination initiated.  I have conducted a focused provider triage encounter, findings are as follows:    Brief history of present illness:  48-year-old female with history of anxiety and hypertension presents for evaluation of right-sided unilateral vision loss for over 24 hours now with dizziness and a right-sided headache with tingling sensation to the right side of her head which started approximately 2 hours ago.        Vitals:    04/21/23 0511   BP: (!) 157/80   Pulse: (!) 56   Resp: 16   Temp: 97.8 °F (36.6 °C)   TempSrc: Oral   SpO2: 97%       Pertinent physical exam:  Speech, finger-to-nose, heel-to-shin, and cranial nerves III-XII grossly intact. Vision is 20/13 on the left and only light perception on the right (central dark spot with a peripheral reddish glow).    Brief workup plan:  Labs, CT of the head and neck    Preliminary workup initiated; this workup will be continued and followed by the physician or advanced practice provider that is assigned to the patient when roomed.

## 2023-04-21 NOTE — ED NOTES
"Patient identifiers for Rakel Domingo Prasanna 48 y.o. female checked and correct.  Chief Complaint   Patient presents with    Blindness     Pt c/o R eye blindness x1day pt states "it began with a black spot in my vision I thought it was a floater but now I can't see anything pt reports HA that began 2 hours ago with associated numbness and tingling to R side of head.    Transfer     Arrives via EMS from [facility] here for opthalmology consult      Past Medical History:   Diagnosis Date    Bipolar 1 disorder     Hypertension     Iron deficiency anemia 6/4/2018    Sickle cell trait      Allergies reported:   Review of patient's allergies indicates:   Allergen Reactions    Reglan [metoclopramide hcl] Hallucinations         LOC: Patient is awake, alert, and aware of environment with an appropriate affect. Patient is oriented x 4 and speaking appropriately.  APPEARANCE: Patient resting comfortably and in no acute distress. Patient is clean and well groomed, patient's clothing is properly fastened.  HEENT: WDL  SKIN: The skin is warm and dry. Patient has normal skin turgor and moist mucus membranes.   MUSKULOSKELETAL: Patient is moving all extremities well, no obvious deformities noted. Pulses intact.   RESPIRATORY: Airway is open and patent. Respirations are spontaneous and non-labored with normal effort and rate.  CARDIAC: Patient has a normal rate and rhythm. 60 on cardiac monitor. No peripheral edema noted.   ABDOMEN: No distention noted. Soft and non-tender upon palpation.  NEUROLOGICAL: RIGHT EYE, 6mm dialated, LEFT eye 2mm, loss of vision in right eye, "sees all black with a red halo"  Facial expression is symmetrical. Hand grasps are equal bilaterally. Normal sensation in all extremities when touched with finger.          "

## 2023-04-21 NOTE — DISCHARGE INSTRUCTIONS
Follow-up with the ophthalmologist as discussed     If you experience any new or concerning changes to either eye, return to the emergency department

## 2023-04-21 NOTE — ED TRIAGE NOTES
"Chief Complaint   Patient presents with    Blindness     Pt c/o R eye blindness x1day pt states "it began with a black spot in my vision I thought it was a floater but now I can't see anything pt reports HA that began 2 hours ago with associated numbness and tingling to R side of head.      "

## 2023-04-24 ENCOUNTER — TELEPHONE (OUTPATIENT)
Dept: OPHTHALMOLOGY | Facility: CLINIC | Age: 48
End: 2023-04-24
Payer: MEDICAID

## 2023-04-24 LAB — HGB S BLD QL: POSITIVE

## 2023-04-24 NOTE — TELEPHONE ENCOUNTER
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 Patient has been seen by Rosa who requested she come to clinic for injection on 4/25.     Thanks!    Patient's Best Contact Number: [phone]    Noe Youssef MD  [facility] Ophthalmology, PGY-2  Pager: [phone]

## 2023-04-24 NOTE — TELEPHONE ENCOUNTER
----- Message from Lucy Figueroa sent at 4/24/2023  9:41 AM CDT -----  Regarding: FW: Follow up    ----- Message -----  From: Keo Vaughn MA  Sent: 4/24/2023   9:32 AM CDT  To: Rosa Ram Staff  Subject: FW: Follow up                                    Good Morning,    I called patient this morning to schedule an appointment lvm can you please give patient another call later to schedule appointment with  per Dr. 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 Patient has been seen by Rosa who requested she come to clinic for injection on 4/25.     Thanks!    Patient's Best Contact Number: [phone]    Noe Youssef MD  [facility] Ophthalmology, PGY-2  Pager: [phone]

## 2023-04-25 ENCOUNTER — TELEPHONE (OUTPATIENT)
Dept: OPHTHALMOLOGY | Facility: CLINIC | Age: 48
End: 2023-04-25
Payer: MEDICAID

## 2023-04-25 ENCOUNTER — PROCEDURE VISIT (OUTPATIENT)
Dept: OPHTHALMOLOGY | Facility: CLINIC | Age: 48
End: 2023-04-25
Payer: MEDICAID

## 2023-04-25 DIAGNOSIS — H36.829 PROLIFERATIVE RETINOPATHY DUE TO SICKLE CELL DISEASE: Primary | ICD-10-CM

## 2023-04-25 DIAGNOSIS — H43.13 VITREOUS HEMORRHAGE, BILATERAL: ICD-10-CM

## 2023-04-25 DIAGNOSIS — D57.1 PROLIFERATIVE RETINOPATHY DUE TO SICKLE CELL DISEASE: Primary | ICD-10-CM

## 2023-04-25 DIAGNOSIS — H25.13 AGE-RELATED NUCLEAR CATARACT OF BOTH EYES: ICD-10-CM

## 2023-04-25 PROBLEM — H43.11 VITREOUS HEMORRHAGE, RIGHT: Status: ACTIVE | Noted: 2023-04-25

## 2023-04-25 LAB
FERRITIN SERPL-MCNC: 72 MCG/L (ref 11–307)
HGB A MFR BLD ELPH: 0 % (ref 95.8–98)
HGB A2 MFR BLD: 3.4 % (ref 2–3.3)
HGB A2+XXX MFR BLD ELPH: ABNORMAL %
HGB F MFR BLD: 2.5 % (ref 0–0.9)
HGB XXX MFR BLD ELPH: ABNORMAL %
PATH REV BLD -IMP: ABNORMAL
PROVIDER SIGNING NAME: ABNORMAL

## 2023-04-25 PROCEDURE — 76512 B SCAN: ICD-10-PCS | Mod: 26,50,S$PBB, | Performed by: OPHTHALMOLOGY

## 2023-04-25 PROCEDURE — 67028 INJECTION EYE DRUG: CPT | Mod: PBBFAC,RT | Performed by: OPHTHALMOLOGY

## 2023-04-25 PROCEDURE — 92134 CPTRZ OPH DX IMG PST SGM RTA: CPT | Mod: PBBFAC | Performed by: OPHTHALMOLOGY

## 2023-04-25 PROCEDURE — 99214 PR OFFICE/OUTPT VISIT, EST, LEVL IV, 30-39 MIN: ICD-10-PCS | Mod: 25,S$PBB,, | Performed by: OPHTHALMOLOGY

## 2023-04-25 PROCEDURE — 67028 PR INJECT INTRAVITREAL PHARMCOLOGIC: ICD-10-PCS | Mod: S$PBB,RT,, | Performed by: OPHTHALMOLOGY

## 2023-04-25 PROCEDURE — 67028 INJECTION EYE DRUG: CPT | Mod: S$PBB,RT,, | Performed by: OPHTHALMOLOGY

## 2023-04-25 PROCEDURE — 76512 OPH US DX B-SCAN: CPT | Mod: 50,PBBFAC | Performed by: OPHTHALMOLOGY

## 2023-04-25 PROCEDURE — 92134 OCT, RETINA - OU - BOTH EYES: ICD-10-PCS | Mod: 26,S$PBB,, | Performed by: OPHTHALMOLOGY

## 2023-04-25 PROCEDURE — 99214 OFFICE O/P EST MOD 30 MIN: CPT | Mod: 25,S$PBB,, | Performed by: OPHTHALMOLOGY

## 2023-04-25 RX ADMIN — Medication 1.25 MG: at 10:04

## 2023-04-25 NOTE — TELEPHONE ENCOUNTER
Calledand left messag and told her she is ok to Take tylenol Otc and if she needed anything else to give us a call back.

## 2023-04-25 NOTE — PROGRESS NOTES
HPI    OD on 4/19/23 new floaters then turned into one dark spot with red around   outer part of it,. Vision OD much worse that day. OD sharp ache pain next   day.  Then later that night pt was dizzy.  Va OS with new floaters 4/20/23   but and vision decreased also.  Last edited by Theodora Yeung on 4/25/2023  9:38 AM.         A/P    ICD-10-CM ICD-9-CM   1. 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 Vitreous hemorrhage, bilateral  H43.13 379.23   3. Age-related nuclear cataract of both eyes  H25.13 366.16       1. 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 Vitreous hemorrhage, bilateral  ED f/u for new vision loss  Has sickle trait  Noticed decline in vision OD last week    OD: no injections or laser  VA HM, large dense subhyaloid hemorrhage, retina flat on B scan   Plan: needs YUKI OD for active proliferative disease  Based on todays exam, diagnostic studies, and review of records, the determination was made for treatment today.  Schedule Avastin Injection today Right Eye Patient chooses to proceed with injection R/B/A discussed include infection retinal detachment and stroke    Patient identified.  Timeout performed.    Risks, benefits, and alternatives to treatment were discussed in detail with the patient, including bleeding/infection (endophthalmitis)/etc.  The patient voiced understanding and wished to proceed with the procedure.  See separate consent form.    Injection Procedure Note:  Diagnosis: VH Right Eye    Topical Proparacaine drop placed then topical 5% Betadine and then subconjunctival lidocaine 2% injection  Sterile gloves used, and sterile lid speculum placed.  5% Betadine placed at injection site again prior to injection.  Avastin 1.25mg in 0.05cc Injected inferotemporally 3.5-4mm posterior to the limbus.  Complications: None  Va at least CF at 5 feet post injection.  Retina, ONH, IOP normal after injection.    Followup as below.  Patient  should return immediately PRN.  Retinal Detachment and Endophthalmitis precautions given.     OS: no injections or laser  VA 20/20, peripheral regressing NV, old heme inferiorly  Plan: will benefit from peripheral light PRP for proliferative disease, will bring back in 2-3 weeks     3. Age-related nuclear cataract of both eyes  Mild NS, NVS  Plan: Observation Update Mrx prn    RTC 2 weeks DFE/PRP OS  RTC 4 weeks DFE/OCTm OU, possible Avastin OD vs sign up for PPV      I saw and examined the patient and reviewed in detail the findings documented. The final examination findings, image interpretations, and plan as documented in the record represent my personal judgment and conclusions.    Yo Lee MD  Vitreoretinal Surgery   [facility]

## 2023-04-25 NOTE — TELEPHONE ENCOUNTER
----- Message from Mart Mays MA sent at 4/25/2023 12:12 PM CDT -----  Regarding: Med request  Contact: [phone]  Patient's  states wife is in need of headache medication, please call and advise.      [id number] [address]  Phone: [phone] Fax: [phone]

## 2023-04-27 ENCOUNTER — TELEPHONE (OUTPATIENT)
Dept: OPHTHALMOLOGY | Facility: CLINIC | Age: 48
End: 2023-04-27
Payer: MEDICAID

## 2023-04-27 NOTE — TELEPHONE ENCOUNTER
Advise to use Systane or Refresh QID to help with dryness.    ----- Message from Leanne Nieto sent at 4/27/2023  9:45 AM CDT -----  Regarding: Update  Pt's right eye is red, painful, runny with tears and feels gritty. It started yesterday in the afternoon. Pt requesting a Rx. Please call back to assist       Ms. Ellington @ [phone]

## 2023-05-08 LAB
ALPHA GLOBIN RELEASED BY: NORMAL
ALPHA-GLOBIN SPECIMEN: NORMAL
GENETICIST REVIEW: NORMAL
HBA1 GENE MUT ANL BLD/T: NORMAL
HBA1 GENE MUT ANL BLD/T: NORMAL
PATH REV BLD -IMP: NORMAL
PROVIDER SIGNING NAME: NORMAL
REF LAB TEST METHOD: NORMAL
SERVICE CMNT-IMP: NORMAL
SPECIMEN SOURCE: NORMAL

## 2023-05-09 ENCOUNTER — OFFICE VISIT (OUTPATIENT)
Dept: OPHTHALMOLOGY | Facility: CLINIC | Age: 48
End: 2023-05-09
Payer: MEDICAID

## 2023-05-09 DIAGNOSIS — H43.13 VITREOUS HEMORRHAGE, BILATERAL: ICD-10-CM

## 2023-05-09 DIAGNOSIS — D57.1 PROLIFERATIVE RETINOPATHY DUE TO SICKLE CELL DISEASE: Primary | ICD-10-CM

## 2023-05-09 DIAGNOSIS — H36.829 PROLIFERATIVE RETINOPATHY DUE TO SICKLE CELL DISEASE: Primary | ICD-10-CM

## 2023-05-09 DIAGNOSIS — H25.13 AGE-RELATED NUCLEAR CATARACT OF BOTH EYES: ICD-10-CM

## 2023-05-09 PROCEDURE — 99499 UNLISTED E&M SERVICE: CPT | Mod: S$PBB,,, | Performed by: OPHTHALMOLOGY

## 2023-05-09 PROCEDURE — 67228 PR TREATMENT EXTENSIVE RETINOPATHY, PHOTOCOAGULATION: ICD-10-PCS | Mod: S$PBB,LT,, | Performed by: OPHTHALMOLOGY

## 2023-05-09 PROCEDURE — 67228 TREATMENT X10SV RETINOPATHY: CPT | Mod: S$PBB,LT,, | Performed by: OPHTHALMOLOGY

## 2023-05-09 PROCEDURE — 99999 PR PBB SHADOW E&M-EST. PATIENT-LVL III: ICD-10-PCS | Mod: PBBFAC,,, | Performed by: OPHTHALMOLOGY

## 2023-05-09 PROCEDURE — 99213 OFFICE O/P EST LOW 20 MIN: CPT | Mod: PBBFAC | Performed by: OPHTHALMOLOGY

## 2023-05-09 PROCEDURE — 67228 TREATMENT X10SV RETINOPATHY: CPT | Mod: PBBFAC,LT | Performed by: OPHTHALMOLOGY

## 2023-05-09 PROCEDURE — 99999 PR PBB SHADOW E&M-EST. PATIENT-LVL III: CPT | Mod: PBBFAC,,, | Performed by: OPHTHALMOLOGY

## 2023-05-09 PROCEDURE — 99499 NO LOS: ICD-10-PCS | Mod: S$PBB,,, | Performed by: OPHTHALMOLOGY

## 2023-05-09 NOTE — PROGRESS NOTES
HPI    Patient presents for PRP OS  VA OD still black.  OD still feels numb.  Last edited by Theodora Yeung on 5/9/2023  1:20 PM.         A/P    ICD-10-CM ICD-9-CM   1. 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 Vitreous hemorrhage, bilateral  H43.13 379.23   3. Age-related nuclear cataract of both eyes  H25.13 366.16     1. 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 Vitreous hemorrhage, bilateral  ED f/u for new vision loss  Has sickle trait  Noticed decline in vision OD last week    OD: s/p YUKI 4/25/23  VA HM, large dense subhyaloid hemorrhage, retina flat on B scan   Plan: observe for now, consider repeat Avastin vs PPV next visit     OS: no injections or laser  VA 20/20, peripheral regressing NV, old heme inferiorly  Plan: needs PRP OS for NV  Based on todays exam, diagnostic studies, and review of records, the determination was made for treatment today.    Schedule Panretinal photocoagulation today Left Eye Patient chooses to proceed with laser R/B/A discussed patient elects to proceed    Patient identified.  Timeout performed.    Laser Procedure Note  Panretinal Photocoagulation laser Left Eye  Anesthesia: topical Proparacaine  Complications: none  Size: 200 microns  Duration: 80 ms  Power: 250  Number: 232  Good laser uptake, no complications  F/u as above, RTC sooner PRN        3. Age-related nuclear cataract of both eyes  Mild NS, NVS  Plan: Observation Update Mrx prn    RTC 2 weeks DFE/OCTm OU, possible Avastin OD vs sign up for PPV      I saw and examined the patient and reviewed in detail the findings documented. The final examination findings, image interpretations, and plan as documented in the record represent my personal judgment and conclusions.    Yo Lee MD  Vitreoretinal Surgery   [facility]

## 2023-05-31 ENCOUNTER — TELEPHONE (OUTPATIENT)
Dept: OPHTHALMOLOGY | Facility: CLINIC | Age: 48
End: 2023-05-31
Payer: MEDICAID

## 2023-06-09 ENCOUNTER — PROCEDURE VISIT (OUTPATIENT)
Dept: OPHTHALMOLOGY | Facility: CLINIC | Age: 48
End: 2023-06-09
Payer: MEDICAID

## 2023-06-09 ENCOUNTER — TELEPHONE (OUTPATIENT)
Dept: OPHTHALMOLOGY | Facility: CLINIC | Age: 48
End: 2023-06-09
Payer: MEDICAID

## 2023-06-09 DIAGNOSIS — H25.13 AGE-RELATED NUCLEAR CATARACT OF BOTH EYES: ICD-10-CM

## 2023-06-09 DIAGNOSIS — H36.829 PROLIFERATIVE RETINOPATHY DUE TO SICKLE CELL DISEASE: Primary | ICD-10-CM

## 2023-06-09 DIAGNOSIS — D57.1 PROLIFERATIVE RETINOPATHY DUE TO SICKLE CELL DISEASE: Primary | ICD-10-CM

## 2023-06-09 DIAGNOSIS — H43.11 VITREOUS HEMORRHAGE OF RIGHT EYE: Primary | ICD-10-CM

## 2023-06-09 DIAGNOSIS — H43.13 VITREOUS HEMORRHAGE, BILATERAL: ICD-10-CM

## 2023-06-09 PROCEDURE — 76512 B SCAN: ICD-10-PCS | Mod: 26,50,S$PBB, | Performed by: OPHTHALMOLOGY

## 2023-06-09 PROCEDURE — 99214 PR OFFICE/OUTPT VISIT, EST, LEVL IV, 30-39 MIN: ICD-10-PCS | Mod: S$PBB,,, | Performed by: OPHTHALMOLOGY

## 2023-06-09 PROCEDURE — 92134 OCT, RETINA - OU - BOTH EYES: ICD-10-PCS | Mod: 26,S$PBB,, | Performed by: OPHTHALMOLOGY

## 2023-06-09 PROCEDURE — 92134 CPTRZ OPH DX IMG PST SGM RTA: CPT | Mod: PBBFAC | Performed by: OPHTHALMOLOGY

## 2023-06-09 PROCEDURE — 99214 OFFICE O/P EST MOD 30 MIN: CPT | Mod: S$PBB,,, | Performed by: OPHTHALMOLOGY

## 2023-06-09 PROCEDURE — 76512 OPH US DX B-SCAN: CPT | Mod: 50,PBBFAC | Performed by: OPHTHALMOLOGY

## 2023-06-09 RX ORDER — PREDNISOLONE ACETATE 10 MG/ML
1 SUSPENSION/ DROPS OPHTHALMIC
Status: CANCELLED | OUTPATIENT
Start: 2023-06-09

## 2023-06-09 RX ORDER — MOXIFLOXACIN 5 MG/ML
1 SOLUTION/ DROPS OPHTHALMIC
Status: CANCELLED | OUTPATIENT
Start: 2023-06-09

## 2023-06-09 RX ORDER — AMOXICILLIN AND CLAVULANATE POTASSIUM 875; 125 MG/1; MG/1
1 TABLET, FILM COATED ORAL EVERY 12 HOURS
COMMUNITY
Start: 2023-04-26 | End: 2023-08-28 | Stop reason: CLARIF

## 2023-06-09 RX ORDER — CYCLOPENTOLATE HYDROCHLORIDE 10 MG/ML
1 SOLUTION/ DROPS OPHTHALMIC
Status: CANCELLED | OUTPATIENT
Start: 2023-06-09

## 2023-06-09 RX ORDER — PHENYLEPHRINE HYDROCHLORIDE 25 MG/ML
1 SOLUTION/ DROPS OPHTHALMIC
Status: CANCELLED | OUTPATIENT
Start: 2023-06-09

## 2023-06-09 RX ORDER — TETRACAINE HYDROCHLORIDE 5 MG/ML
1 SOLUTION OPHTHALMIC
Status: CANCELLED | OUTPATIENT
Start: 2023-06-09

## 2023-06-09 NOTE — PROGRESS NOTES
HPI    Pt sts OS still has a few floaters. Viaion not good in OD   VA OD still black.    Last edited by Monique Johnson on 6/9/2023 11:23 AM.     HPI    Pt sts OS still has a few floaters. Viaion not good in OD   VA OD still black.    Last edited by Monique Johnson on 6/9/2023 11:23 AM.         A/P    ICD-10-CM ICD-9-CM   1. 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 Vitreous hemorrhage, bilateral  H43.13 379.23   3. Age-related nuclear cataract of both eyes  H25.13 366.16     1. 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 Vitreous hemorrhage, bilateral  ED f/u for new vision loss  Has sickle trait  Noticed decline in vision OD last week    OD: s/p YUKI 4/25/23  VA HM stable, older heme now but still dense throughout, B scan retina flat    Plan: given persistent hemorrhage, needs vitrectomy, planning for 6/12/23 under mac/block    Risks, benefits and alternatives to surgery and anesthesia including no treatment were discussed with the patient including: death, coma, blindness, bleeding, retinal detachment, cataract, need for more surgeries and glaucoma. The patient understands and accepts risks All questions were answered and the patient wishes to proceed with surgery    Recommend Pars Plana Vitrectomy / Endolaser/ possible membrane peel Right Eye   R/B/A to surgery and anesthesia discussed with patient including: death, coma, blindness, bleeding, retinal detachment, cataract, and glaucoma Patient understands and accepts risks All questions answered Patient wishes to proceed with surgery      OS: s/p PRP 5/9/23  VA 20/20, peripheral regressing NV, old heme inferiorly, peripheral laser   Plan: regressing NV, peripheral laser, observe        3. Age-related nuclear cataract of both eyes  Mild NS, NVS  Plan: Observation Update Mrx prn    RTC post-op      I saw and examined the patient and reviewed in detail the findings documented. The final examination findings,  image interpretations, and plan as documented in the record represent my personal judgment and conclusions.    Yo Lee MD  Vitreoretinal Surgery   [facility]

## 2023-06-11 ENCOUNTER — ANESTHESIA EVENT (OUTPATIENT)
Dept: SURGERY | Facility: HOSPITAL | Age: 48
End: 2023-06-11
Payer: MEDICAID

## 2023-06-12 ENCOUNTER — HOSPITAL ENCOUNTER (OUTPATIENT)
Facility: HOSPITAL | Age: 48
Discharge: HOME | End: 2023-06-12
Attending: OPHTHALMOLOGY | Admitting: OPHTHALMOLOGY
Payer: MEDICAID

## 2023-06-12 ENCOUNTER — ANESTHESIA (OUTPATIENT)
Dept: SURGERY | Facility: HOSPITAL | Age: 48
End: 2023-06-12
Payer: MEDICAID

## 2023-06-12 VITALS
OXYGEN SATURATION: 100 % | DIASTOLIC BLOOD PRESSURE: 64 MMHG | HEIGHT: 63 IN | WEIGHT: 175 LBS | BODY MASS INDEX: 31.01 KG/M2 | HEART RATE: 72 BPM | RESPIRATION RATE: 17 BRPM | TEMPERATURE: 98 F | SYSTOLIC BLOOD PRESSURE: 116 MMHG

## 2023-06-12 DIAGNOSIS — H36.829 PROLIFERATIVE RETINOPATHY DUE TO SICKLE CELL DISEASE: ICD-10-CM

## 2023-06-12 DIAGNOSIS — H43.13 VITREOUS HEMORRHAGE, BILATERAL: ICD-10-CM

## 2023-06-12 DIAGNOSIS — D57.1 PROLIFERATIVE RETINOPATHY DUE TO SICKLE CELL DISEASE: ICD-10-CM

## 2023-06-12 PROCEDURE — 71000044 HC DOSC ROUTINE RECOVERY FIRST HOUR: Performed by: OPHTHALMOLOGY

## 2023-06-12 PROCEDURE — D9220A PRA ANESTHESIA: Mod: CRNA,,, | Performed by: NURSE ANESTHETIST, CERTIFIED REGISTERED

## 2023-06-12 PROCEDURE — 67040 PR VITRECTOMY,PANRETINAL LASER RX: ICD-10-PCS | Mod: RT,,, | Performed by: OPHTHALMOLOGY

## 2023-06-12 PROCEDURE — 71000015 HC POSTOP RECOV 1ST HR: Performed by: OPHTHALMOLOGY

## 2023-06-12 PROCEDURE — D9220A PRA ANESTHESIA: ICD-10-PCS | Mod: CRNA,,, | Performed by: NURSE ANESTHETIST, CERTIFIED REGISTERED

## 2023-06-12 PROCEDURE — D9220A PRA ANESTHESIA: Mod: ANES,,, | Performed by: STUDENT IN AN ORGANIZED HEALTH CARE EDUCATION/TRAINING PROGRAM

## 2023-06-12 PROCEDURE — 36000707: Performed by: OPHTHALMOLOGY

## 2023-06-12 PROCEDURE — C1784 OCULAR DEV, INTRAOP, DET RET: HCPCS | Performed by: OPHTHALMOLOGY

## 2023-06-12 PROCEDURE — 36000706: Performed by: OPHTHALMOLOGY

## 2023-06-12 PROCEDURE — 67040 LASER TREATMENT OF RETINA: CPT | Mod: RT,,, | Performed by: OPHTHALMOLOGY

## 2023-06-12 PROCEDURE — 25000003 PHARM REV CODE 250: Performed by: OPHTHALMOLOGY

## 2023-06-12 PROCEDURE — 27201423 OPTIME MED/SURG SUP & DEVICES STERILE SUPPLY: Performed by: OPHTHALMOLOGY

## 2023-06-12 PROCEDURE — 63600175 PHARM REV CODE 636 W HCPCS: Performed by: NURSE ANESTHETIST, CERTIFIED REGISTERED

## 2023-06-12 PROCEDURE — 99499 UNLISTED E&M SERVICE: CPT | Mod: ,,, | Performed by: STUDENT IN AN ORGANIZED HEALTH CARE EDUCATION/TRAINING PROGRAM

## 2023-06-12 PROCEDURE — 25000003 PHARM REV CODE 250: Performed by: NURSE ANESTHETIST, CERTIFIED REGISTERED

## 2023-06-12 PROCEDURE — 99499 NO LOS: ICD-10-PCS | Mod: ,,, | Performed by: STUDENT IN AN ORGANIZED HEALTH CARE EDUCATION/TRAINING PROGRAM

## 2023-06-12 PROCEDURE — 37000009 HC ANESTHESIA EA ADD 15 MINS: Performed by: OPHTHALMOLOGY

## 2023-06-12 PROCEDURE — 00145 ANES PX EYE VITREORTNL SURG: CPT | Performed by: OPHTHALMOLOGY

## 2023-06-12 PROCEDURE — D9220A PRA ANESTHESIA: ICD-10-PCS | Mod: ANES,,, | Performed by: STUDENT IN AN ORGANIZED HEALTH CARE EDUCATION/TRAINING PROGRAM

## 2023-06-12 PROCEDURE — 63600175 PHARM REV CODE 636 W HCPCS: Performed by: OPHTHALMOLOGY

## 2023-06-12 PROCEDURE — 37000008 HC ANESTHESIA 1ST 15 MINUTES: Performed by: OPHTHALMOLOGY

## 2023-06-12 RX ORDER — MIDAZOLAM HYDROCHLORIDE 1 MG/ML
INJECTION INTRAMUSCULAR; INTRAVENOUS
Status: DISCONTINUED | OUTPATIENT
Start: 2023-06-12 | End: 2023-06-12

## 2023-06-12 RX ORDER — DEXAMETHASONE SODIUM PHOSPHATE 4 MG/ML
INJECTION, SOLUTION INTRA-ARTICULAR; INTRALESIONAL; INTRAMUSCULAR; INTRAVENOUS; SOFT TISSUE
Status: DISCONTINUED
Start: 2023-06-12 | End: 2023-06-12 | Stop reason: HOSPADM

## 2023-06-12 RX ORDER — BUPIVACAINE HYDROCHLORIDE 7.5 MG/ML
INJECTION, SOLUTION EPIDURAL; RETROBULBAR
Status: DISCONTINUED | OUTPATIENT
Start: 2023-06-12 | End: 2023-06-12 | Stop reason: HOSPADM

## 2023-06-12 RX ORDER — MOXIFLOXACIN 5 MG/ML
1 SOLUTION/ DROPS OPHTHALMIC
Status: DISCONTINUED | OUTPATIENT
Start: 2023-06-12 | End: 2023-06-12 | Stop reason: HOSPADM

## 2023-06-12 RX ORDER — CYCLOPENTOLATE HYDROCHLORIDE 10 MG/ML
1 SOLUTION/ DROPS OPHTHALMIC
Status: DISCONTINUED | OUTPATIENT
Start: 2023-06-12 | End: 2023-06-12 | Stop reason: HOSPADM

## 2023-06-12 RX ORDER — DEXAMETHASONE SODIUM PHOSPHATE 4 MG/ML
INJECTION, SOLUTION INTRA-ARTICULAR; INTRALESIONAL; INTRAMUSCULAR; INTRAVENOUS; SOFT TISSUE
Status: DISCONTINUED | OUTPATIENT
Start: 2023-06-12 | End: 2023-06-12 | Stop reason: HOSPADM

## 2023-06-12 RX ORDER — DEXMEDETOMIDINE HYDROCHLORIDE 100 UG/ML
INJECTION, SOLUTION INTRAVENOUS
Status: DISCONTINUED | OUTPATIENT
Start: 2023-06-12 | End: 2023-06-12

## 2023-06-12 RX ORDER — EPINEPHRINE 1 MG/ML
INJECTION, SOLUTION, CONCENTRATE INTRAVENOUS
Status: DISCONTINUED | OUTPATIENT
Start: 2023-06-12 | End: 2023-06-12 | Stop reason: HOSPADM

## 2023-06-12 RX ORDER — LIDOCAINE HYDROCHLORIDE 20 MG/ML
INJECTION INTRAVENOUS
Status: DISCONTINUED | OUTPATIENT
Start: 2023-06-12 | End: 2023-06-12

## 2023-06-12 RX ORDER — PREDNISOLONE ACETATE 10 MG/ML
1 SUSPENSION/ DROPS OPHTHALMIC
Status: DISCONTINUED | OUTPATIENT
Start: 2023-06-12 | End: 2023-06-12 | Stop reason: HOSPADM

## 2023-06-12 RX ORDER — PROPOFOL 10 MG/ML
VIAL (ML) INTRAVENOUS
Status: DISCONTINUED | OUTPATIENT
Start: 2023-06-12 | End: 2023-06-12

## 2023-06-12 RX ORDER — MOXIFLOXACIN 5 MG/ML
SOLUTION/ DROPS OPHTHALMIC
Status: DISCONTINUED | OUTPATIENT
Start: 2023-06-12 | End: 2023-06-12 | Stop reason: HOSPADM

## 2023-06-12 RX ORDER — TETRACAINE HYDROCHLORIDE 5 MG/ML
1 SOLUTION OPHTHALMIC
Status: DISCONTINUED | OUTPATIENT
Start: 2023-06-12 | End: 2023-06-12 | Stop reason: HOSPADM

## 2023-06-12 RX ORDER — PHENYLEPHRINE HYDROCHLORIDE 25 MG/ML
1 SOLUTION/ DROPS OPHTHALMIC
Status: DISCONTINUED | OUTPATIENT
Start: 2023-06-12 | End: 2023-06-12 | Stop reason: HOSPADM

## 2023-06-12 RX ADMIN — CYCLOPENTOLATE HYDROCHLORIDE 1 DROP: 10 SOLUTION/ DROPS OPHTHALMIC at 08:06

## 2023-06-12 RX ADMIN — PHENYLEPHRINE HYDROCHLORIDE 1 DROP: 25 SOLUTION/ DROPS OPHTHALMIC at 08:06

## 2023-06-12 RX ADMIN — PREDNISOLONE ACETATE 1 DROP: 10 SUSPENSION/ DROPS OPHTHALMIC at 08:06

## 2023-06-12 RX ADMIN — PROPOFOL 20 MG: 10 INJECTION, EMULSION INTRAVENOUS at 10:06

## 2023-06-12 RX ADMIN — MIDAZOLAM HYDROCHLORIDE 1 MG: 1 INJECTION INTRAMUSCULAR; INTRAVENOUS at 10:06

## 2023-06-12 RX ADMIN — LIDOCAINE HYDROCHLORIDE 40 MG: 20 INJECTION INTRAVENOUS at 10:06

## 2023-06-12 RX ADMIN — MOXIFLOXACIN OPHTHALMIC 1 DROP: 5 SOLUTION/ DROPS OPHTHALMIC at 08:06

## 2023-06-12 RX ADMIN — SODIUM CHLORIDE: 0.9 INJECTION, SOLUTION INTRAVENOUS at 10:06

## 2023-06-12 RX ADMIN — DEXMEDETOMIDINE HYDROCHLORIDE 8 MCG: 100 INJECTION, SOLUTION INTRAVENOUS at 10:06

## 2023-06-12 RX ADMIN — TETRACAINE HYDROCHLORIDE 1 DROP: 5 SOLUTION OPHTHALMIC at 08:06

## 2023-06-12 RX ADMIN — PROPOFOL 50 MG: 10 INJECTION, EMULSION INTRAVENOUS at 10:06

## 2023-06-12 RX ADMIN — MIDAZOLAM HYDROCHLORIDE 2 MG: 1 INJECTION INTRAMUSCULAR; INTRAVENOUS at 10:06

## 2023-06-12 RX ADMIN — GLYCOPYRROLATE 0.2 MG: 0.2 INJECTION, SOLUTION INTRAMUSCULAR; INTRAVENOUS at 10:06

## 2023-06-12 NOTE — TRANSFER OF CARE
"Anesthesia Transfer of Care Note    Patient: Rakel Mims    Procedure(s) Performed: Procedure(s) (LRB):  VITRECTOMY, PARS PLANA APPROACH (Right)    Patient location: [facility]    Anesthesia Type: MAC    Transport from OR: Transported from OR on room air with adequate spontaneous ventilation    Post pain: adequate analgesia    Post assessment: no apparent anesthetic complications    Post vital signs: stable    Level of consciousness: awake, alert and oriented    Nausea/Vomiting: no nausea/vomiting    Complications: none    Transfer of care protocol was followed      Last vitals:   Visit Vitals  BP (!) 116/56 (BP Location: Left arm, Patient Position: Lying)   Pulse 65   Temp 36.4 °C (97.5 °F) (Temporal)   Resp 18   Ht 5' 3" (1.6 m)   Wt 79.4 kg (175 lb)   LMP 08/21/2019 (Exact Date)   SpO2 100%   Breastfeeding No   BMI 31.00 kg/m²     "

## 2023-06-12 NOTE — ANESTHESIA POSTPROCEDURE EVALUATION
Anesthesia Post Evaluation    Patient: Rakel Mims    Procedure(s) Performed: Procedure(s) (LRB):  VITRECTOMY, PARS PLANA APPROACH (Right)    Final Anesthesia Type: MAC      Patient location during evaluation: PACU  Patient participation: Yes- Able to Participate  Level of consciousness: awake  Post-procedure vital signs: reviewed and stable  Pain management: adequate  Airway patency: patent    PONV status at discharge: No PONV  Anesthetic complications: no      Cardiovascular status: blood pressure returned to baseline  Respiratory status: unassisted, spontaneous ventilation and room air            Vitals Value Taken Time   /72 06/12/23 1203   Temp 36.6 °C (97.9 °F) 06/12/23 1130   Pulse 60 06/12/23 1200   Resp 17 06/12/23 1130   SpO2 99 % 06/12/23 1200   Vitals shown include unvalidated device data.      No case tracking events are documented in the log.      Pain/Telly Score: Telly Score: 10 (6/12/2023 11:30 AM)

## 2023-06-12 NOTE — OP NOTE
Date of Surgery 6/12/2023      ATTENDING SURGEON: Yo Lee MD (A)     ASSISTANT SURGEON: Noe West MD PhD (F)      PREOPERATIVE DIAGNOSIS: Nonclearing vitreous hemorrhage of the  righteye. Proliferative sickle cell retinopathy right eye     OPERATION: Pars plana vitrectomy, endolaser, fluid air exchange of the right  eye.      POSTOPERATIVE DIAGNOSIS: same      ANESTHESIA: MAC.      COMPLICATIONS: None.     SPECIMENS:     None.     EBL:     Minimal     Indications for Surgery     Rakel Mims  is a 48 y.o.  y.o. year old year old with a history of sickle cell and nonclearing vitreous hemorrhage in the right  eye. After the risks, benefits and alternatives were discussed with Rakel Mims    the patient consented to the procedure and was scheduled for surgery.     Description of Procedure:     The patient, Rakel Mims ave informed consent for the following operation which was performed under monitored anesthesia care .A time out was performed and confirmed by all in the room that the right  eye was the correct operative eye. Following induction of propofol anesthesia, a retrobulbar block of 0.75% Marcaine and 4% lidocaine was administered. The eye was prepped and draped in the usual sterile fashion for vitrectomy surgery.      The cornea was kept moist with OVD placed on the surface throughout the operation. A 25 gauge vitrectomy system was used. Initial entry into the eye was gained with 3 trocars placed 4mm from the limbus. In the inferotemporal location, the infusion cannula was secured in position. The open end of the cannula was visualized through the dilated pupil and the infusion was turned on when it was in the correct position. The 2 superior sclerotomies were used for entry of a light pipe and a vitrectomy cutter. Upon entry into the eye, dense vitreous hemorrhage was noted. A core vitrectomy was conducted.The posterior hyaloid was noted to be elevated, followed by peripheral  vitrectomy out towards the vitreous base.     We inspected the retinal periphery with scleral depression. Temporal neovascularization was noted.  Using the endolaser, laser was applied 360 in periphery for the patient's ischemic retinopathy. A fluid-air exchange was performed leaving the eye under partial air. The trocars were removed and the sclerotomies were found to be watertight. The intraocular pressure remained normal. Betadine was place on the eye. Vancomycin and dex were injected into the sub-Tenon space. The eye was irrigated with BSS. A drop of Maxitrol ointment and atropine were placed on the eye, which was then patched and shielded. There were no complications.     The patient returned to the PACU in stable condition and will follow up tomorrow in the eye clinic.

## 2023-06-12 NOTE — ANESTHESIA PREPROCEDURE EVALUATION
Pre-operative evaluation for Procedure(s) (LRB):  VITRECTOMY, PARS PLANA APPROACH (Right)    Rakel Mims is a 48 y.o. female w/ hx of sickle cell trait, HTN, anxiety, bipolar disorder, and vitreous hemorrhage of R eye who presents for the above procedure.       Prev airway: (09/10/18)  Method of Intubation: Direct laryngoscopy, Mcnair; Inserted by: CRNA; Airway Device: Endotracheal Tube; Mask Ventilation: Easy; Intubated: Postinduction; Blade: Markel #3; Airway Device Size: 7.5; Style: Cuffed; Cuff Inflation: Minimal occlusive pressure; Inflation Amount: 5; Placement Verified By: Auscultation, Capnometry, ETT Condensation; Grade: Grade I; Complicating Factors: Obesity; Intubation Findings: Positive EtCO2, Bilateral breath sounds, Atraumatic/Condition of teeth unchanged;  Depth of Insertion: 21; Securment: Lips; Complications: None; Breath Sounds: Equal Bilateral; Insertion Attempts: 1    EKG (11/23/21):   Vent. Rate : 057 BPM     Atrial Rate : 057 BPM      P-R Int : 140 ms          QRS Dur : 076 ms       QT Int : 462 ms       P-R-T Axes : 042 033 033 degrees      QTc Int : 449 ms     Sinus bradycardia   Otherwise normal ECG     2D Echo: none on record    Patient Active Problem List   Diagnosis    Vitamin D deficiency    Mild episode of recurrent major depressive disorder    Iron deficiency anemia due to chronic blood loss    Subclinical hyperthyroidism    Serum total bilirubin elevated    Abnormal uterine bleeding (AUB)    Bipolar affective disorder, current episode depressed    Generalized anxiety disorder    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     Current Outpatient Medications on File Prior to Encounter    Medication Sig Dispense Refill    albuterol (PROVENTIL/VENTOLIN HFA) 90 mcg/actuation inhaler Inhale 2 puffs into the lungs every 6 (six) hours as needed for Wheezing or Shortness of Breath. 6.7 g 0    ALPRAZolam (XANAX) 0.5 MG tablet Take 1 tablet (0.5 mg total) by mouth nightly as needed for Insomnia or Anxiety. 30 tablet 1    amoxicillin-clavulanate 875-125mg (AUGMENTIN) 875-125 mg per tablet Take 1 tablet by mouth every 12 (twelve) hours.      aspirin (ECOTRIN) 81 MG EC tablet Take 81 mg by mouth once daily.      doxepin (SINEQUAN) 25 MG capsule Take 1 capsule (25 mg total) by mouth every evening. 30 capsule 1    gabapentin (NEURONTIN) 100 MG capsule Take 1 capsule by mouth 3 (three) times daily.      hydroCHLOROthiazide (HYDRODIURIL) 25 MG tablet Take 25 mg by mouth.      losartan (COZAAR) 50 MG tablet Take 1 tablet by mouth once daily.      meclizine (ANTIVERT) 25 mg tablet Take 25 mg by mouth.      naproxen (NAPROSYN) 375 MG tablet Take 1 tablet (375 mg total) by mouth 2 (two) times daily as needed (pain). 20 tablet 0    ondansetron (ZOFRAN) 4 MG tablet Take 1 tablet (4 mg total) by mouth every 6 (six) hours as needed for Nausea. 30 tablet 1    propranoloL (INDERAL) 20 MG tablet Take 1 tablet by mouth 3 (three) times daily.         Past Surgical History:   Procedure Laterality Date    ABLATION OF ENDOMETRIUM USING RESECTOSCOPE  9/10/2018    CHOLECYSTECTOMY      DILATION AND CURETTAGE OF UTERUS  9/10/2018    HYSTEROSCOPY WITH DILATION AND CURETTAGE OF UTERUS N/A 9/10/2018    TUBAL LIGATION         Social History     Socioeconomic History    Marital status: Single   Tobacco Use    Smoking status: Never    Smokeless tobacco: Never   Substance and Sexual Activity    Alcohol use: Yes     Comment: occasionally    Drug use: No    Sexual activity: Not Currently     Birth control/protection: See Surgical Hx     Comment: Bilateral Tubal Ligation         Vital Signs Range (Last 24H):          CBC: No results for input(s): WBC, RBC, HGB, HCT, PLT, MCV, MCH, MCHC in the last 72 hours.    CMP: No results for input(s): NA, K, CL, CO2, BUN, CREATININE, GLU, MG, PHOS, CALCIUM, ALBUMIN, PROT, ALKPHOS, ALT, AST, BILITOT in the last 72 hours.    INR  No results for input(s): PT, INR, PROTIME, APTT in the last 72 hours.          Pre-op Assessment    I have reviewed the Patient Summary Reports.     I have reviewed the Nursing Notes. I have reviewed the NPO Status.   I have reviewed the Medications.     Review of Systems  Anesthesia Hx:  No problems with previous Anesthesia   Denies Personal Hx of Anesthesia complications.   Hematology/Oncology:     Oncology Normal     Cardiovascular:   Hypertension    Pulmonary:  Pulmonary Normal    Renal/:  Renal/ Normal     Hepatic/GI:  Hepatic/GI Normal    Neurological:  Neurology Normal    Endocrine:   Hyperthyroidism    Psych:   Psychiatric History anxiety          Physical Exam  General: Alert and Oriented    Airway:  Mallampati: II   Mouth Opening: Normal  TM Distance: Normal  Tongue: Normal    Dental:  Intact    Chest/Lungs:  Clear to auscultation, Normal Respiratory Rate    Heart:  Rate: Normal  Rhythm: Regular Rhythm        Anesthesia Plan  Type of Anesthesia, risks & benefits discussed:    Anesthesia Type: MAC  Intra-op Monitoring Plan: Standard ASA Monitors  Post Op Pain Control Plan: IV/PO Opioids PRN and multimodal analgesia  Induction:  IV  Informed Consent: Informed consent signed with the Patient and all parties understand the risks and agree with anesthesia plan.  All questions answered.   ASA Score: 2  Day of Surgery Review of History & Physical: H&P Update referred to the surgeon/provider.    Ready For Surgery From Anesthesia Perspective.     .

## 2023-06-12 NOTE — PATIENT INSTRUCTIONS
Post-Operative Instructions:    - Keep eye shield & dressing in place until we remove it tomorrow in eye clinic.  - It is all right to watch television or to read.   - Tylenol as needed for general discomfort.     - Maintain head in a HEAD UP position  - Keep your face out of water for five days after surgery - NO SHOWERS.  - No excessive exercise.    - No bending, lifting or straining.   - Call MD if significant pain or nausea / vomiting uncontrolled by medications  - Call MD if temperature in excess of 101' F  - Return to eye clinic for post op examination tomorrow morning.  - Bring medicine bag to tomorrow's appointment.    FOR EMERGENCIES:  If any unusual problems or difficulties occur, contact Dr. West or the eye resident on call at the hospital main line

## 2023-06-12 NOTE — H&P
Pre-Operative History & Physical  Ophthalmology      SUBJECTIVE:     History of Present Illness:  Patient is a 48 y.o. female presents with vitreous hemorrhage and sickle cell disease right eye. Here for surgical repair.     MEDICATIONS:   PTA Medications   Medication Sig    albuterol (PROVENTIL/VENTOLIN HFA) 90 mcg/actuation inhaler Inhale 2 puffs into the lungs every 6 (six) hours as needed for Wheezing or Shortness of Breath.    ALPRAZolam (XANAX) 0.5 MG tablet Take 1 tablet (0.5 mg total) by mouth nightly as needed for Insomnia or Anxiety.    amoxicillin-clavulanate 875-125mg (AUGMENTIN) 875-125 mg per tablet Take 1 tablet by mouth every 12 (twelve) hours.    aspirin (ECOTRIN) 81 MG EC tablet Take 81 mg by mouth once daily.    doxepin (SINEQUAN) 25 MG capsule Take 1 capsule (25 mg total) by mouth every evening.    gabapentin (NEURONTIN) 100 MG capsule Take 1 capsule by mouth 3 (three) times daily.    hydroCHLOROthiazide (HYDRODIURIL) 25 MG tablet Take 25 mg by mouth.    losartan (COZAAR) 50 MG tablet Take 1 tablet by mouth once daily.    meclizine (ANTIVERT) 25 mg tablet Take 25 mg by mouth.    naproxen (NAPROSYN) 375 MG tablet Take 1 tablet (375 mg total) by mouth 2 (two) times daily as needed (pain).    ondansetron (ZOFRAN) 4 MG tablet Take 1 tablet (4 mg total) by mouth every 6 (six) hours as needed for Nausea.    propranoloL (INDERAL) 20 MG tablet Take 1 tablet by mouth 3 (three) times daily.       ALLERGIES:   Review of patient's allergies indicates:   Allergen Reactions    Reglan [metoclopramide hcl] Hallucinations       PAST MEDICAL HISTORY:   Past Medical History:   Diagnosis Date    Age-related nuclear cataract of both eyes 4/25/2023    Bipolar 1 disorder     Hypertension     Iron deficiency anemia 6/4/2018    Sickle cell trait      PAST SURGICAL HISTORY:   Past Surgical History:   Procedure Laterality Date    ABLATION OF ENDOMETRIUM USING RESECTOSCOPE  9/10/2018    CHOLECYSTECTOMY      DILATION AND  CURETTAGE OF UTERUS  9/10/2018    HYSTEROSCOPY WITH DILATION AND CURETTAGE OF UTERUS N/A 9/10/2018    TUBAL LIGATION       PAST FAMILY HISTORY:   Family History   Problem Relation Age of Onset    Hypertension Mother     Heart failure Father     Hypertension Father     Ulcers Father     Hypertension Sister     ADD / ADHD Daughter     ADD / ADHD Son     No Known Problems Brother     No Known Problems Maternal Aunt     No Known Problems Maternal Uncle     No Known Problems Paternal Aunt     No Known Problems Paternal Uncle     No Known Problems Maternal Grandmother     No Known Problems Maternal Grandfather     No Known Problems Paternal Grandmother     No Known Problems Paternal Grandfather     Breast cancer Neg Hx     Colon cancer Neg Hx     Ovarian cancer Neg Hx     Amblyopia Neg Hx     Blindness Neg Hx     Cancer Neg Hx     Cataracts Neg Hx     Diabetes Neg Hx     Glaucoma Neg Hx     Macular degeneration Neg Hx     Retinal detachment Neg Hx     Strabismus Neg Hx     Stroke Neg Hx     Thyroid disease Neg Hx      SOCIAL HISTORY:   Social History     Tobacco Use    Smoking status: Never    Smokeless tobacco: Never   Substance Use Topics    Alcohol use: Yes     Comment: occasionally    Drug use: No        MENTAL STATUS: Alert    REVIEW OF SYSTEMS: Negative    OBJECTIVE:     Vital Signs (Most Recent)  Temp: 97.5 °F (36.4 °C) (06/12/23 0809)  Pulse: 65 (06/12/23 0809)  Resp: 18 (06/12/23 0809)  BP: (!) 152/68 (06/12/23 0809)  SpO2: 99 % (06/12/23 0809)    Physical Exam:  General: NAD  HEENT: AT/NC, NSC OU  Lungs: Adequate respirations  Heart: + pulses  Abdomen: Soft    ASSESSMENT/PLAN:     Patient is a 48 y.o. female with vitreous hemorrhage and sickle cell retinopathy right eye      - Risks/benefits/alternatives of the procedure including, but not limited to, infection, bleeding, pain, ptosis, macular edema, corneal edema, persistent corneal defect, retinal tears, retinal detachment, epiretinal membrane, elevated  intraocular pressure, hypotony, cataract formation, possible need for strict post-op head positioning, possible temporary avoidance of air travel, loss of vision, loss of the eye, paralysis, and death were discussed with the patient and/or family. The patient/family voiced good understanding, the informed consent was signed, witnessed, and placed in chart. All patient and family questions were answered.   - Will proceed with 25G PPV/EL/possible MP/AFx and any other necessary procedures right eye   - Plan for local/MAC with retrobulbar block  - Allergies reviewed:   Review of patient's allergies indicates:   Allergen Reactions    Reglan [metoclopramide hcl] Hallucinations     - patient is taking ASA, OK to continue    Noe West MD  Fellow, [facility]

## 2023-06-12 NOTE — BRIEF OP NOTE
Brief Operative Note  Ophthalmology     Pre-Op Dx: vitreous hemorrhage right     Post Op Dx: same     Procedure Performed: vitrectomy, laser right     Attending Surgeon: Yo Lee MD     Assistant Surgeon: Noe West MD    Anesthesia: Local/Mac, retrobulbar injection of 10cc mixture 0.75%Marcaine, 2% Xylocaine    Estimated blood loss: Minimal    Complications: None    Specimen: None    Disposition: Stable to recovery    Findings/Outcome: retina attached, heme cleared     Date of Discharge: today 6/12/2023     Discharge Disposition: home     F/U: tomorrow 9 AM with Dr Lee

## 2023-06-13 ENCOUNTER — OFFICE VISIT (OUTPATIENT)
Dept: OPHTHALMOLOGY | Facility: CLINIC | Age: 48
End: 2023-06-13
Payer: MEDICAID

## 2023-06-13 DIAGNOSIS — H36.829 PROLIFERATIVE RETINOPATHY DUE TO SICKLE CELL DISEASE: Primary | ICD-10-CM

## 2023-06-13 DIAGNOSIS — H43.13 VITREOUS HEMORRHAGE, BILATERAL: ICD-10-CM

## 2023-06-13 DIAGNOSIS — H25.13 AGE-RELATED NUCLEAR CATARACT OF BOTH EYES: ICD-10-CM

## 2023-06-13 DIAGNOSIS — D57.1 PROLIFERATIVE RETINOPATHY DUE TO SICKLE CELL DISEASE: Primary | ICD-10-CM

## 2023-06-13 PROCEDURE — 99999 PR PBB SHADOW E&M-EST. PATIENT-LVL II: ICD-10-PCS | Mod: PBBFAC,,, | Performed by: OPHTHALMOLOGY

## 2023-06-13 PROCEDURE — 1159F MED LIST DOCD IN RCRD: CPT | Mod: CPTII,,, | Performed by: OPHTHALMOLOGY

## 2023-06-13 PROCEDURE — 1159F PR MEDICATION LIST DOCUMENTED IN MEDICAL RECORD: ICD-10-PCS | Mod: CPTII,,, | Performed by: OPHTHALMOLOGY

## 2023-06-13 PROCEDURE — 99999 PR PBB SHADOW E&M-EST. PATIENT-LVL II: CPT | Mod: PBBFAC,,, | Performed by: OPHTHALMOLOGY

## 2023-06-13 PROCEDURE — 99212 OFFICE O/P EST SF 10 MIN: CPT | Mod: PBBFAC | Performed by: OPHTHALMOLOGY

## 2023-06-13 PROCEDURE — 99024 POSTOP FOLLOW-UP VISIT: CPT | Mod: ,,, | Performed by: OPHTHALMOLOGY

## 2023-06-13 PROCEDURE — 99024 PR POST-OP FOLLOW-UP VISIT: ICD-10-PCS | Mod: ,,, | Performed by: OPHTHALMOLOGY

## 2023-06-13 NOTE — LETTER
[facility] - [address] [place] [address]  [address]  [facility] [address]  Phone: [phone]  Fax: [phone] June 13, 2023     Patient: Rakel Mims   YOB: 1975   Date of Visit: 6/13/2023       To Whom It May Concern:    Rakel Mims is followed at our ophthalmology clinic and underwent surgery on 6/12/23. She should remain off of work until July 11, 2023 for her post-operative one month check up.      If you have any questions or concerns, please don't hesitate to contact my office.    Sincerely,        Yo Lee MD

## 2023-06-13 NOTE — PROGRESS NOTES
HPI    Procedure Performed: vitrectomy, laser right     48 y.o. female is here for 1 day PO OD. Denies eye pain. Right eye feel   mendez, itching and slight burning.     Eye Med's: No gtts   Last edited by WASHINGTON Solis on 6/13/2023  8:56 AM.          A/P    ICD-10-CM ICD-9-CM   1. 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 Vitreous hemorrhage, bilateral  H43.13 379.23   3. Age-related nuclear cataract of both eyes  H25.13 366.16       1. 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 Vitreous hemorrhage, bilateral  ED f/u for new vision loss  Has sickle trait  Noticed decline in vision OD last week    OD: s/p YUKI 4/25/23  Pre-Op VA HM stable, older heme now but still dense throughout, B scan retina flat    given persistent hemorrhage, needs vitrectomy, planning for 6/12/23 under mac/block    Postop: s/p PPV/EL/AFx (Date 6/12/23 by Rosa/Brett) for NCVH OD  Positioning: Upright  Duration: 5 days    6/13/2023 VA 20/30, partial air fill, IOP 20, retina flat, peripheral NV temporal moreso, we did 360 peripheral laser for ischemia    Instructions reviewed   - RD precautions  - Return for increasing pain/decreasing vision  - No getting the eye wet, no rubbing the eye, no heavy lifting for 1 month after surgery  Drops:  Vigamox  - 4   Pred - 4(start 6/13/2023)  Cyc -4  Maxitrol - QHS           OS: s/p PRP 5/9/23  VA 20/20, peripheral regressing NV, old heme inferiorly, peripheral laser   Plan: regressing NV, peripheral laser, observe        3. Age-related nuclear cataract of both eyes  Mild NS, NVS  Plan: Observation Update Mrx prn    RTC POW1 DFE/OCTm OD      I saw and examined the patient and reviewed in detail the findings documented. The final examination findings, image interpretations, and plan as documented in the record represent my personal judgment and conclusions.    Yo Lee MD  Vitreoretinal Surgery   [facility]

## 2023-07-05 NOTE — PROGRESS NOTES
HPI     1 MONTH F/U  SICKLE CELL DIEASES      Additional comments: DLS    06/13/2023  PROLIFERATIVE  RETINOPATHY with SICKLE CELL DISEASE      VH  OU  S/P PPV  /EL  ON 06/12/2023  NCVH  OD                Comments    EYE MEDS     VIGAMOX  QID  OD   PRED F QID  OD  CYCLO  QID  OD  MAXITROL  QHS OD    MILD NS  OU  NVS OU     PT SAYS RUNNING LOW ON GTTS TODAY  ALMOST OUT           Last edited by Kirsten Olmedo on 7/6/2023 10:57 AM.            A/P    ICD-10-CM ICD-9-CM   1. 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 Vitreous hemorrhage, bilateral  H43.13 379.23   3. Age-related nuclear cataract of both eyes  H25.13 366.16         1. 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 Vitreous hemorrhage, bilateral  ED f/u for new vision loss  Has sickle trait  Noticed decline in vision OD last week    OD: s/p YUKI 4/25/23  Pre-Op VA HM stable, older heme now but still dense throughout, B scan retina flat    given persistent hemorrhage, needs vitrectomy, planning for 6/12/23 under mac/block    Postop: s/p PPV/EL/AFx (Date 6/12/23 by Rosa/Brett) for NCVH OD  Positioning: Upright  Duration: 5 days    7/5/2023 VA 20/50 (was 20/30), IOP 19, air resolved, shakeout heme today , retina flat, hazy     Sleep head up position, will bring back 2 weeks consider Aavastin next visit     Instructions reviewed   - RD precautions  - Return for increasing pain/decreasing vision  - No getting the eye wet, no rubbing the eye, no heavy lifting for 1 month after surgery  Drops:    Pred - 4(start weekly taper              OS: s/p PRP 5/9/23  VA 20/20, peripheral regressing NV, old heme inferiorly, peripheral laser   Plan: regressing NV, peripheral laser, observe        3. Age-related nuclear cataract of both eyes  Mild NS, NVS  Plan: Observation Update Mrx prn    RTC2-3 weeks DFE/OCTm OD, poss Avastin OD       I saw and examined the patient and reviewed in detail the findings documented. The final  examination findings, image interpretations, and plan as documented in the record represent my personal judgment and conclusions.    Yo Lee MD  Vitreoretinal Surgery   [facility]

## 2023-07-06 ENCOUNTER — OFFICE VISIT (OUTPATIENT)
Dept: OPHTHALMOLOGY | Facility: CLINIC | Age: 48
End: 2023-07-06
Payer: MEDICAID

## 2023-07-06 DIAGNOSIS — H25.13 AGE-RELATED NUCLEAR CATARACT OF BOTH EYES: ICD-10-CM

## 2023-07-06 DIAGNOSIS — D57.1 PROLIFERATIVE RETINOPATHY DUE TO SICKLE CELL DISEASE: Primary | ICD-10-CM

## 2023-07-06 DIAGNOSIS — H43.13 VITREOUS HEMORRHAGE, BILATERAL: ICD-10-CM

## 2023-07-06 DIAGNOSIS — H36.829 PROLIFERATIVE RETINOPATHY DUE TO SICKLE CELL DISEASE: Primary | ICD-10-CM

## 2023-07-06 PROCEDURE — 99999 PR PBB SHADOW E&M-EST. PATIENT-LVL II: CPT | Mod: PBBFAC,,, | Performed by: OPHTHALMOLOGY

## 2023-07-06 PROCEDURE — 99024 PR POST-OP FOLLOW-UP VISIT: ICD-10-PCS | Mod: ,,, | Performed by: OPHTHALMOLOGY

## 2023-07-06 PROCEDURE — 1159F MED LIST DOCD IN RCRD: CPT | Mod: CPTII,,, | Performed by: OPHTHALMOLOGY

## 2023-07-06 PROCEDURE — 99024 POSTOP FOLLOW-UP VISIT: CPT | Mod: ,,, | Performed by: OPHTHALMOLOGY

## 2023-07-06 PROCEDURE — 92134 CPTRZ OPH DX IMG PST SGM RTA: CPT | Mod: PBBFAC | Performed by: OPHTHALMOLOGY

## 2023-07-06 PROCEDURE — 1160F PR REVIEW ALL MEDS BY PRESCRIBER/CLIN PHARMACIST DOCUMENTED: ICD-10-PCS | Mod: CPTII,,, | Performed by: OPHTHALMOLOGY

## 2023-07-06 PROCEDURE — 99212 OFFICE O/P EST SF 10 MIN: CPT | Mod: PBBFAC | Performed by: OPHTHALMOLOGY

## 2023-07-06 PROCEDURE — 1160F RVW MEDS BY RX/DR IN RCRD: CPT | Mod: CPTII,,, | Performed by: OPHTHALMOLOGY

## 2023-07-06 PROCEDURE — 1159F PR MEDICATION LIST DOCUMENTED IN MEDICAL RECORD: ICD-10-PCS | Mod: CPTII,,, | Performed by: OPHTHALMOLOGY

## 2023-07-06 PROCEDURE — 99999 PR PBB SHADOW E&M-EST. PATIENT-LVL II: ICD-10-PCS | Mod: PBBFAC,,, | Performed by: OPHTHALMOLOGY

## 2023-07-06 PROCEDURE — 92134 OCT, RETINA - OU - BOTH EYES: ICD-10-PCS | Mod: 26,S$PBB,, | Performed by: OPHTHALMOLOGY

## 2023-07-28 ENCOUNTER — PATIENT MESSAGE (OUTPATIENT)
Dept: OPTOMETRY | Facility: CLINIC | Age: 48
End: 2023-07-28
Payer: MEDICAID

## 2023-07-28 ENCOUNTER — OFFICE VISIT (OUTPATIENT)
Dept: OPHTHALMOLOGY | Facility: CLINIC | Age: 48
End: 2023-07-28
Payer: MEDICAID

## 2023-07-28 DIAGNOSIS — H43.13 VITREOUS HEMORRHAGE, BILATERAL: ICD-10-CM

## 2023-07-28 DIAGNOSIS — D57.1 PROLIFERATIVE RETINOPATHY DUE TO SICKLE CELL DISEASE: Primary | ICD-10-CM

## 2023-07-28 DIAGNOSIS — H36.829 PROLIFERATIVE RETINOPATHY DUE TO SICKLE CELL DISEASE: Primary | ICD-10-CM

## 2023-07-28 PROCEDURE — 99024 POSTOP FOLLOW-UP VISIT: CPT | Mod: ,,, | Performed by: OPHTHALMOLOGY

## 2023-07-28 PROCEDURE — 99213 OFFICE O/P EST LOW 20 MIN: CPT | Mod: PBBFAC | Performed by: OPHTHALMOLOGY

## 2023-07-28 PROCEDURE — 99999 PR PBB SHADOW E&M-EST. PATIENT-LVL III: ICD-10-PCS | Mod: PBBFAC,,, | Performed by: OPHTHALMOLOGY

## 2023-07-28 PROCEDURE — 1160F RVW MEDS BY RX/DR IN RCRD: CPT | Mod: CPTII,,, | Performed by: OPHTHALMOLOGY

## 2023-07-28 PROCEDURE — 1159F PR MEDICATION LIST DOCUMENTED IN MEDICAL RECORD: ICD-10-PCS | Mod: CPTII,,, | Performed by: OPHTHALMOLOGY

## 2023-07-28 PROCEDURE — 99024 PR POST-OP FOLLOW-UP VISIT: ICD-10-PCS | Mod: ,,, | Performed by: OPHTHALMOLOGY

## 2023-07-28 PROCEDURE — 1159F MED LIST DOCD IN RCRD: CPT | Mod: CPTII,,, | Performed by: OPHTHALMOLOGY

## 2023-07-28 PROCEDURE — 92134 CPTRZ OPH DX IMG PST SGM RTA: CPT | Mod: PBBFAC | Performed by: OPHTHALMOLOGY

## 2023-07-28 PROCEDURE — 1160F PR REVIEW ALL MEDS BY PRESCRIBER/CLIN PHARMACIST DOCUMENTED: ICD-10-PCS | Mod: CPTII,,, | Performed by: OPHTHALMOLOGY

## 2023-07-28 PROCEDURE — 99999 PR PBB SHADOW E&M-EST. PATIENT-LVL III: CPT | Mod: PBBFAC,,, | Performed by: OPHTHALMOLOGY

## 2023-07-28 PROCEDURE — 92134 OCT, RETINA - OU - BOTH EYES: ICD-10-PCS | Mod: 26,S$PBB,, | Performed by: OPHTHALMOLOGY

## 2023-07-28 NOTE — PROGRESS NOTES
HPI    VA OU no worse but still has floaters OD.  EYE MEDS: PF BID OD  Last edited by Theodora Yeung on 7/28/2023 10:38 AM.          A/P    ICD-10-CM ICD-9-CM   1. 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 Vitreous hemorrhage, bilateral  H43.13 379.23           1. 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 Vitreous hemorrhage, bilateral  ED f/u for new vision loss  Has sickle trait  Noticed decline in vision OD last week    OD: s/p YUKI 4/25/23  Pre-Op VA HM stable, older heme now but still dense throughout, B scan retina flat    given persistent hemorrhage, needs vitrectomy, planning for 6/12/23 under mac/block    Postop: s/p PPV/EL/AFx (Date 6/12/23 by Rosa/Brett) for NCVH OD  Positioning: Upright  Duration: 5 days    7/28/2023 VA 20/25 (was 20/50), IOP 19, shakeout heme better , retina flat     Plan: heme improving, can defer avastin today, will recheck in 6 weeks     Instructions reviewed   - RD precautions  - Return for increasing pain/decreasing vision  - No getting the eye wet, no rubbing the eye, no heavy lifting for 1 month after surgery     Can dect PF to daily and stop next week            OS: s/p PRP 5/9/23  VA 20/20, peripheral regressing NV, old heme inferiorly, peripheral laser   Plan: regressing NV, peripheral laser, observe        3. Age-related nuclear cataract of both eyes  Mild NS, NVS  Plan: Observation Update Mrx      RTC 6 weeks DFE/OCTm OD, poss Avastin OD       I saw and examined the patient and reviewed in detail the findings documented. The final examination findings, image interpretations, and plan as documented in the record represent my personal judgment and conclusions.    Yo Lee MD  Vitreoretinal Surgery   [facility]

## 2023-08-28 ENCOUNTER — HOSPITAL ENCOUNTER (EMERGENCY)
Facility: HOSPITAL | Age: 48
Discharge: HOME | End: 2023-08-28
Attending: EMERGENCY MEDICINE
Payer: MEDICAID

## 2023-08-28 VITALS
HEIGHT: 63 IN | BODY MASS INDEX: 30.12 KG/M2 | RESPIRATION RATE: 16 BRPM | WEIGHT: 170 LBS | HEART RATE: 68 BPM | SYSTOLIC BLOOD PRESSURE: 137 MMHG | OXYGEN SATURATION: 98 % | TEMPERATURE: 98 F | DIASTOLIC BLOOD PRESSURE: 91 MMHG

## 2023-08-28 DIAGNOSIS — M54.9 BACK PAIN, UNSPECIFIED BACK LOCATION, UNSPECIFIED BACK PAIN LATERALITY, UNSPECIFIED CHRONICITY: ICD-10-CM

## 2023-08-28 DIAGNOSIS — R06.02 SHORTNESS OF BREATH: Primary | ICD-10-CM

## 2023-08-28 DIAGNOSIS — D69.6 THROMBOCYTOPENIA: ICD-10-CM

## 2023-08-28 PROBLEM — F31.81 BIPOLAR 2 DISORDER, MAJOR DEPRESSIVE EPISODE: Status: ACTIVE | Noted: 2020-01-10

## 2023-08-28 PROBLEM — F51.04 CHRONIC INSOMNIA: Status: ACTIVE | Noted: 2021-04-15

## 2023-08-28 PROBLEM — F41.1 GENERALIZED ANXIETY DISORDER: Status: ACTIVE | Noted: 2021-01-06

## 2023-08-28 PROBLEM — Z90.710 H/O: HYSTERECTOMY: Status: ACTIVE | Noted: 2020-08-14

## 2023-08-28 PROBLEM — F43.10 POSTTRAUMATIC STRESS DISORDER: Status: ACTIVE | Noted: 2021-01-06

## 2023-08-28 LAB
ALBUMIN SERPL BCP-MCNC: 3.8 G/DL (ref 3.5–5.2)
ALP SERPL-CCNC: 45 U/L (ref 55–135)
ALT SERPL W/O P-5'-P-CCNC: 14 U/L (ref 10–44)
ANION GAP SERPL CALC-SCNC: 7 MMOL/L (ref 8–16)
AST SERPL-CCNC: 15 U/L (ref 10–40)
BASOPHILS # BLD AUTO: 0.02 K/UL (ref 0–0.2)
BASOPHILS NFR BLD: 0.4 % (ref 0–1.9)
BILIRUB SERPL-MCNC: 1.8 MG/DL (ref 0.1–1)
BNP SERPL-MCNC: 54 PG/ML (ref 0–99)
BUN SERPL-MCNC: 10 MG/DL (ref 6–20)
CALCIUM SERPL-MCNC: 9.1 MG/DL (ref 8.7–10.5)
CHLORIDE SERPL-SCNC: 105 MMOL/L (ref 95–110)
CO2 SERPL-SCNC: 26 MMOL/L (ref 23–29)
CREAT SERPL-MCNC: 0.7 MG/DL (ref 0.5–1.4)
DIFFERENTIAL METHOD: ABNORMAL
EOSINOPHIL # BLD AUTO: 0.1 K/UL (ref 0–0.5)
EOSINOPHIL NFR BLD: 1.3 % (ref 0–8)
ERYTHROCYTE [DISTWIDTH] IN BLOOD BY AUTOMATED COUNT: 14.4 % (ref 11.5–14.5)
EST. GFR  (NO RACE VARIABLE): >60 ML/MIN/1.73 M^2
GLUCOSE SERPL-MCNC: 82 MG/DL (ref 70–110)
HCT VFR BLD AUTO: 31.6 % (ref 37–48.5)
HGB BLD-MCNC: 11.1 G/DL (ref 12–16)
IMM GRANULOCYTES # BLD AUTO: 0.05 K/UL (ref 0–0.04)
IMM GRANULOCYTES NFR BLD AUTO: 1 % (ref 0–0.5)
LYMPHOCYTES # BLD AUTO: 1.5 K/UL (ref 1–4.8)
LYMPHOCYTES NFR BLD: 29.3 % (ref 18–48)
MCH RBC QN AUTO: 27.5 PG (ref 27–31)
MCHC RBC AUTO-ENTMCNC: 35.1 G/DL (ref 32–36)
MCV RBC AUTO: 78 FL (ref 82–98)
MONOCYTES # BLD AUTO: 0.4 K/UL (ref 0.3–1)
MONOCYTES NFR BLD: 6.9 % (ref 4–15)
NEUTROPHILS # BLD AUTO: 3.2 K/UL (ref 1.8–7.7)
NEUTROPHILS NFR BLD: 61.1 % (ref 38–73)
NRBC BLD-RTO: 0 /100 WBC
PLATELET # BLD AUTO: 116 K/UL (ref 150–450)
PMV BLD AUTO: 9.5 FL (ref 9.2–12.9)
POTASSIUM SERPL-SCNC: 3.9 MMOL/L (ref 3.5–5.1)
PROT SERPL-MCNC: 6.8 G/DL (ref 6–8.4)
RBC # BLD AUTO: 4.03 M/UL (ref 4–5.4)
SODIUM SERPL-SCNC: 138 MMOL/L (ref 136–145)
TROPONIN I SERPL DL<=0.01 NG/ML-MCNC: <0.006 NG/ML (ref 0–0.03)
WBC # BLD AUTO: 5.22 K/UL (ref 3.9–12.7)

## 2023-08-28 PROCEDURE — 99285 EMERGENCY DEPT VISIT HI MDM: CPT | Mod: 25

## 2023-08-28 PROCEDURE — 63600175 PHARM REV CODE 636 W HCPCS: Performed by: PHYSICIAN ASSISTANT

## 2023-08-28 PROCEDURE — 84484 ASSAY OF TROPONIN QUANT: CPT | Performed by: PHYSICIAN ASSISTANT

## 2023-08-28 PROCEDURE — 93010 ELECTROCARDIOGRAM REPORT: CPT | Mod: ,,, | Performed by: INTERNAL MEDICINE

## 2023-08-28 PROCEDURE — 85025 COMPLETE CBC W/AUTO DIFF WBC: CPT | Performed by: PHYSICIAN ASSISTANT

## 2023-08-28 PROCEDURE — 93010 EKG 12-LEAD: ICD-10-PCS | Mod: ,,, | Performed by: INTERNAL MEDICINE

## 2023-08-28 PROCEDURE — 80053 COMPREHEN METABOLIC PANEL: CPT | Performed by: PHYSICIAN ASSISTANT

## 2023-08-28 PROCEDURE — 83880 ASSAY OF NATRIURETIC PEPTIDE: CPT | Performed by: PHYSICIAN ASSISTANT

## 2023-08-28 PROCEDURE — 93005 ELECTROCARDIOGRAM TRACING: CPT

## 2023-08-28 PROCEDURE — 96374 THER/PROPH/DIAG INJ IV PUSH: CPT

## 2023-08-28 RX ORDER — KETOROLAC TROMETHAMINE 30 MG/ML
10 INJECTION, SOLUTION INTRAMUSCULAR; INTRAVENOUS
Status: COMPLETED | OUTPATIENT
Start: 2023-08-28 | End: 2023-08-28

## 2023-08-28 RX ADMIN — KETOROLAC TROMETHAMINE 10 MG: 30 INJECTION, SOLUTION INTRAMUSCULAR; INTRAVENOUS at 08:08

## 2023-08-29 ENCOUNTER — TELEPHONE (OUTPATIENT)
Dept: PULMONOLOGY | Facility: CLINIC | Age: 48
End: 2023-08-29
Payer: MEDICAID

## 2023-08-29 NOTE — ED PROVIDER NOTES
"Encounter Date: 8/28/2023       History     Chief Complaint   Patient presents with    Shortness of Breath     Sob "for a while" and Left-sided chest pain radiates to back. Denies medical problems.       48-year-old female with bipolar disorder, hypertension, iron-deficiency anemia, sickle cell trait presents for shortness of breath for about 4 months.  Her shortness of breath is constant, worse with eating. She was seeing her  in the hospital who is currently admitted which prompted her to come to the ED for evaluation.  She endorses intermittent chest discomfort but none currently, diffuse back pain, night sweats, fatigue and unintentional 25 lb weight loss.  She has had some pain in bilateral legs but has not appreciated any leg swelling.  She denies orthopnea, wheezing, cough, abdominal pain, bloody or dark stool or nausea/vomiting.  No history of DVT/PE, no recent surgeries or immobilization.  Does not take any hormonal medications.  She is s/p hysterectomy.  She reports passive smoke exposure in the past but is a nonsmoker.      Review of patient's allergies indicates:   Allergen Reactions    Reglan [metoclopramide hcl] Hallucinations    Hydrocodone      Other reaction(s): Other (See Comments)  Gets hot then dizzy, vomits, then lucid    Lamotrigine Other (See Comments)     hallucination    Metoclopramide      Other reaction(s): Other (See Comments)     Past Medical History:   Diagnosis Date    Age-related nuclear cataract of both eyes 4/25/2023    Bipolar 1 disorder     Hypertension     Iron deficiency anemia 6/4/2018    Sickle cell trait      Past Surgical History:   Procedure Laterality Date    ABLATION OF ENDOMETRIUM USING RESECTOSCOPE  9/10/2018    CHOLECYSTECTOMY      DILATION AND CURETTAGE OF UTERUS  9/10/2018    HYSTEROSCOPY WITH DILATION AND CURETTAGE OF UTERUS N/A 9/10/2018    TUBAL LIGATION      VITRECTOMY BY PARS PLANA APPROACH Right 6/12/2023    Procedure: VITRECTOMY, PARS PLANA APPROACH;  " Surgeon: Yo Lee MD;  Location: [facility] OR [address] [place] [address];  Service: Ophthalmology;  Laterality: Right;  Mac block     Family History   Problem Relation Age of Onset    Hypertension Mother     Heart failure Father     Hypertension Father     Ulcers Father     Hypertension Sister     ADD / ADHD Daughter     ADD / ADHD Son     No Known Problems Brother     No Known Problems Maternal Aunt     No Known Problems Maternal Uncle     No Known Problems Paternal Aunt     No Known Problems Paternal Uncle     No Known Problems Maternal Grandmother     No Known Problems Maternal Grandfather     No Known Problems Paternal Grandmother     No Known Problems Paternal Grandfather     Breast cancer Neg Hx     Colon cancer Neg Hx     Ovarian cancer Neg Hx     Amblyopia Neg Hx     Blindness Neg Hx     Cancer Neg Hx     Cataracts Neg Hx     Diabetes Neg Hx     Glaucoma Neg Hx     Macular degeneration Neg Hx     Retinal detachment Neg Hx     Strabismus Neg Hx     Stroke Neg Hx     Thyroid disease Neg Hx      Social History     Tobacco Use    Smoking status: Never    Smokeless tobacco: Never   Substance Use Topics    Alcohol use: Yes     Comment: occasionally    Drug use: No     Review of Systems    Physical Exam     Initial Vitals   BP Pulse Resp Temp SpO2   08/28/23 1908 08/28/23 1908 08/28/23 1908 08/28/23 1907 08/28/23 1908   (!) 164/78 60 15 98.1 °F (36.7 °C) 100 %      MAP       --                Physical Exam    Nursing note and vitals reviewed.  Constitutional: She appears well-developed and well-nourished. She is not diaphoretic. No distress.   HENT:   Head: Normocephalic and atraumatic.   Eyes: EOM are normal. Pupils are equal, round, and reactive to light.   Neck: Neck supple. No JVD present.   Normal range of motion.  Cardiovascular:  Normal rate, regular rhythm, normal heart sounds and intact distal pulses.     Exam reveals no gallop and no friction rub.       No murmur heard.  No lower extremity edema, erythema, tenderness or  warmth   Pulmonary/Chest: Breath sounds normal. No respiratory distress. She has no wheezes. She has no rhonchi. She has no rales. She exhibits no tenderness.   Abdominal: Abdomen is soft. Bowel sounds are normal. She exhibits no distension and no mass. There is no abdominal tenderness. There is no rebound and no guarding.   Musculoskeletal:         General: Normal range of motion.      Cervical back: Normal range of motion and neck supple.      Comments: Diffuse midline tenderness to palpation of C, T or L-spine.     Neurological: She is alert and oriented to person, place, and time.   Skin: Skin is warm and dry.   Psychiatric: She has a normal mood and affect.         ED Course   Procedures  Labs Reviewed   COMPREHENSIVE METABOLIC PANEL - Abnormal; Notable for the following components:       Result Value    Total Bilirubin 1.8 (*)     Alkaline Phosphatase 45 (*)     Anion Gap 7 (*)     All other components within normal limits   CBC W/ AUTO DIFFERENTIAL - Abnormal; Notable for the following components:    Hemoglobin 11.1 (*)     Hematocrit 31.6 (*)     MCV 78 (*)     Platelets 116 (*)     Immature Granulocytes 1.0 (*)     Immature Grans (Abs) 0.05 (*)     All other components within normal limits   B-TYPE NATRIURETIC PEPTIDE   TROPONIN I     EKG Readings: (Independently Interpreted)   Initial Reading: No STEMI. Previous EKG: Compared with most recent EKG Previous EKG Date: 11/23/2021. Rhythm: Normal Sinus Rhythm. Heart Rate: 73. Ectopy: No Ectopy. ST Segments: Normal ST Segments. Clinical Impression: Normal Sinus Rhythm       Imaging Results              X-Ray Thoracic Spine AP And Lateral (Final result)  Result time 08/28/23 21:08:16      Final result by Terry Brandon MD (08/28/23 21:08:16)                   Impression:      No acute thoracic vertebral fracture.      Electronically signed by: Terry Brandon MD  Date:    08/28/2023  Time:    21:08               Narrative:    EXAMINATION:  XR THORACIC SPINE  AP LATERAL    CLINICAL HISTORY:  Dorsalgia, unspecified    TECHNIQUE:  AP and lateral views of the thoracic spine.    COMPARISON:  03/26/2013.    FINDINGS:  Alignment: Minimal thoracic dextrocurvature convex toward the right.  Alignment otherwise appears beta tape.    Vertebrae: Vertebral body heights are maintained.  Pedicles appear intact.    Discs and facets: Disc heights are maintained.  Minimal multilevel degenerative change, similar to prior.    Miscellaneous: Clips right upper quadrant.                                       X-Ray Lumbar Spine Ap And Lateral (Final result)  Result time 08/28/23 21:11:05      Final result by Terry Brandon MD (08/28/23 21:11:05)                   Impression:      No acute lumbar fracture.      Electronically signed by: Terry Brandon MD  Date:    08/28/2023  Time:    21:11               Narrative:    EXAMINATION:  XR LUMBAR SPINE AP AND LATERAL    CLINICAL HISTORY:  back pain;    TECHNIQUE:  AP, lateral and spot images were performed of the lumbar spine.    COMPARISON:  None    FINDINGS:  Alignment: Mild lumbar curvature convex to the left.  Alignment is otherwise maintained.    Vertebrae: Vertebral body heights are maintained.  No suspicious appearing lytic or blastic lesions.    Discs and facets: Disc heights are maintained.  Facet joints are unremarkable.    Miscellaneous: Surgical clips right upper quadrant.                                       X-Ray Cervical Spine AP And Lateral (Final result)  Result time 08/28/23 21:12:21      Final result by Terry Brandon MD (08/28/23 21:12:21)                   Impression:      No acute cervical fracture.      Electronically signed by: Terry Brandon MD  Date:    08/28/2023  Time:    21:12               Narrative:    EXAMINATION:  XR CERVICAL SPINE AP LATERAL    CLINICAL HISTORY:  Dorsalgia, unspecified    TECHNIQUE:  AP, lateral and open mouth views of the cervical spine were  performed.    COMPARISON:  08/03/2021.    FINDINGS:  C1-C2: Pre-dens space is maintained.  Dens and lateral masses of C1 are unremarkable.    Alignment: Alignment is maintained.  Reversal of normal cervical lordosis.    Vertebrae: Vertebral body heights are maintained.  No suspicious appearing lytic or blastic lesions.    Discs and facets: Disc heights are maintained.  Facet joints are unremarkable.    Miscellaneous: No prevertebral soft tissue thickening.                                       X-Ray Chest PA And Lateral (Final result)  Result time 08/28/23 21:09:50      Final result by Terry Brandon MD (08/28/23 21:09:50)                   Impression:      No acute cardiopulmonary process.      Electronically signed by: Terry Brandon MD  Date:    08/28/2023  Time:    21:09               Narrative:    EXAMINATION:  XR CHEST PA AND LATERAL    CLINICAL HISTORY:  Shortness of breath    TECHNIQUE:  PA and lateral views of the chest were performed.    COMPARISON:  11/23/2021.    FINDINGS:  Surgical clips right upper quadrant.    There is no consolidation, effusion, or pneumothorax.    Cardiomediastinal silhouette is unremarkable.    Regional osseous structures are similar to prior.                                       Medications   ketorolac injection 9.999 mg (9.999 mg Intravenous Given 8/28/23 2014)     Medical Decision Making  48 year old female presenting for shortness of breath for several months duration.  She is hypertensive 164/78 with otherwise normal vitals.  Nontoxic appearing.    Differential diagnosis:  Unclear presentation, concerns include anemia, malignancy, heart failure.  ACS less likely given time course.  Doubt pneumonia.  She also has diffuse spinal tenderness, no trauma but will obtain x-rays to evaluate for pathologic findings.  Pulmonary embolism, however she is PERC negative and this again feels unlikely given 4 months of symptoms.    Will check labs, give Toradol for back pain, obtain  x-rays and reassess.    Pain improved with therapy.  Workup is reassuring.  Stable.  No desaturation with ambulation.  Unclear etiology of symptoms. HEART score 2 which supports discharge home without need for provocative testing.  Will refer to pulmonology for further follow-up. Stressed the importance of follow-up, strict ED return precautions given.  Patient voiced understanding and is comfortable with discharge.             Amount and/or Complexity of Data Reviewed  Labs: ordered. Decision-making details documented in ED Course.  Radiology: ordered. Decision-making details documented in ED Course.    Risk  Prescription drug management.      Additional MDM:   PERC Rule:   Age is greater than or equal to 50 = 0.0  Heart Rate is greater than or equal to 100 = 0.0  SaO2 on room air < 95% = 0.0  Unilateral leg swelling = 0.0  Hemoptysis = 0.0  Recent surgery or trauma = 0.0  Prior PE or DVT =  0.0  Hormone use = 0.00  PERC Score = 0      Heart Score:    History:          Slightly suspicious.  ECG:             Nonspecific repolarisation disturbance  Age:               45-65 years  Risk factors: no risk factors known  Troponin:       Less than or equal to normal limit  Heart Score = 2                ED Course as of 08/28/23 2202   Mon Aug 28, 2023   2048 WBC: 5.22  No leukocytosis [CC]   2048 Hemoglobin(!): 11.1  Mild anemia [CC]   2048 Platelets(!): 116  Mild thrombocytopenia, similar compared to prior [CC]   2121 X-Ray Cervical Spine AP And Lateral  No acute fracture [CC]   2122 X-Ray Lumbar Spine Ap And Lateral  No acute fracture [CC]   2122 X-Ray Chest PA And Lateral  Per my independent interpretation, no consolidation or pulmonary edema [CC]   2123 X-Ray Thoracic Spine AP And Lateral  No acute fracture [CC]   2123 BILIRUBIN TOTAL(!): 1.8  Mildly elevated, similar compared to prior [CC]      ED Course User Index  [CC] Lisseth Santiago PA-C                    Clinical Impression:   Final diagnoses:  [R06.02]  Shortness of breath (Primary)  [D69.6] Thrombocytopenia  [M54.9] Back pain, unspecified back location, unspecified back pain laterality, unspecified chronicity        ED Disposition Condition    Discharge Stable          ED Prescriptions    None       Follow-up Information       Follow up With Specialties Details Why Contact Info Additional Information    Radha Rocha MD Internal Medicine Schedule an appointment as soon as possible for a visit in 1 week  [address]  [place] [address]  [phone]       [facility] Pulmonology Schedule an appointment as soon as possible for a visit in 1 week  [address] [place] [id number] [phone] Please park in the [facility] surface lot on the [address] side. Check in on the 3rd floor.    [facility] - Emergency Dept Emergency Medicine Go to  If symptoms worsen [address] [place] [id number] [phone]              Lisseth Santiago PA-C  08/28/23 2208

## 2023-08-29 NOTE — DISCHARGE INSTRUCTIONS
Diagnosis:  Shortness of breath, low platelets, back pain    I am not sure what is causing your shortness of breath.  Your lab tests showed mildly low platelets which is similar to previous.  Your EKG, chest x-ray and spinal x-rays did not show any broken bones or other concerning findings.    I sent a referral to our pulmonology or lung doctors for follow-up.  Please call to schedule follow-up appointment.  If you start to have any worsening symptoms, please return to the emergency department.

## 2023-08-29 NOTE — TELEPHONE ENCOUNTER
I spoke with Ms Ellington in regards to scheduling her appointment on 9/22/23 at 2:30pm. Patient confirmed and verbalized understanding.

## 2023-08-30 ENCOUNTER — TELEPHONE (OUTPATIENT)
Dept: INTERNAL MEDICINE | Facility: CLINIC | Age: 48
End: 2023-08-30
Payer: MEDICAID

## 2023-09-20 DIAGNOSIS — R06.02 SOB (SHORTNESS OF BREATH): Primary | ICD-10-CM

## 2023-09-22 ENCOUNTER — OFFICE VISIT (OUTPATIENT)
Dept: PULMONOLOGY | Facility: CLINIC | Age: 48
End: 2023-09-22
Payer: MEDICAID

## 2023-09-22 ENCOUNTER — OFFICE VISIT (OUTPATIENT)
Dept: OPHTHALMOLOGY | Facility: CLINIC | Age: 48
End: 2023-09-22
Payer: MEDICAID

## 2023-09-22 ENCOUNTER — HOSPITAL ENCOUNTER (OUTPATIENT)
Dept: PULMONOLOGY | Facility: CLINIC | Age: 48
Discharge: HOME | End: 2023-09-22
Payer: MEDICAID

## 2023-09-22 VITALS
BODY MASS INDEX: 32.43 KG/M2 | SYSTOLIC BLOOD PRESSURE: 136 MMHG | HEIGHT: 63 IN | WEIGHT: 183 LBS | DIASTOLIC BLOOD PRESSURE: 80 MMHG

## 2023-09-22 DIAGNOSIS — R06.02 SOB (SHORTNESS OF BREATH): ICD-10-CM

## 2023-09-22 DIAGNOSIS — H36.829 PROLIFERATIVE RETINOPATHY DUE TO SICKLE CELL DISEASE: Primary | ICD-10-CM

## 2023-09-22 DIAGNOSIS — H43.13 VITREOUS HEMORRHAGE, BILATERAL: ICD-10-CM

## 2023-09-22 DIAGNOSIS — D57.1 PROLIFERATIVE RETINOPATHY DUE TO SICKLE CELL DISEASE: Primary | ICD-10-CM

## 2023-09-22 DIAGNOSIS — H25.13 AGE-RELATED NUCLEAR CATARACT OF BOTH EYES: ICD-10-CM

## 2023-09-22 DIAGNOSIS — R06.02 SHORTNESS OF BREATH: ICD-10-CM

## 2023-09-22 PROCEDURE — 3075F SYST BP GE 130 - 139MM HG: CPT | Mod: CPTII,,, | Performed by: INTERNAL MEDICINE

## 2023-09-22 PROCEDURE — 99204 OFFICE O/P NEW MOD 45 MIN: CPT | Mod: 25,S$PBB,, | Performed by: INTERNAL MEDICINE

## 2023-09-22 PROCEDURE — 3008F PR BODY MASS INDEX (BMI) DOCUMENTED: ICD-10-PCS | Mod: CPTII,,, | Performed by: INTERNAL MEDICINE

## 2023-09-22 PROCEDURE — 94060 EVALUATION OF WHEEZING: CPT | Mod: PBBFAC | Performed by: INTERNAL MEDICINE

## 2023-09-22 PROCEDURE — 1160F RVW MEDS BY RX/DR IN RCRD: CPT | Mod: CPTII,,, | Performed by: OPHTHALMOLOGY

## 2023-09-22 PROCEDURE — 99213 OFFICE O/P EST LOW 20 MIN: CPT | Mod: PBBFAC,25,27 | Performed by: SURGERY

## 2023-09-22 PROCEDURE — 99204 PR OFFICE/OUTPT VISIT, NEW, LEVL IV, 45-59 MIN: ICD-10-PCS | Mod: 25,S$PBB,, | Performed by: INTERNAL MEDICINE

## 2023-09-22 PROCEDURE — 99999 PR PBB SHADOW E&M-EST. PATIENT-LVL II: CPT | Mod: PBBFAC,,, | Performed by: OPHTHALMOLOGY

## 2023-09-22 PROCEDURE — 99999 PR PBB SHADOW E&M-EST. PATIENT-LVL III: ICD-10-PCS | Mod: PBBFAC,,, | Performed by: SURGERY

## 2023-09-22 PROCEDURE — 3008F BODY MASS INDEX DOCD: CPT | Mod: CPTII,,, | Performed by: INTERNAL MEDICINE

## 2023-09-22 PROCEDURE — 1159F MED LIST DOCD IN RCRD: CPT | Mod: CPTII,,, | Performed by: OPHTHALMOLOGY

## 2023-09-22 PROCEDURE — 92134 CPTRZ OPH DX IMG PST SGM RTA: CPT | Mod: PBBFAC | Performed by: OPHTHALMOLOGY

## 2023-09-22 PROCEDURE — 99212 OFFICE O/P EST SF 10 MIN: CPT | Mod: PBBFAC,25 | Performed by: OPHTHALMOLOGY

## 2023-09-22 PROCEDURE — 92014 PR EYE EXAM, EST PATIENT,COMPREHESV: ICD-10-PCS | Mod: S$PBB,,, | Performed by: OPHTHALMOLOGY

## 2023-09-22 PROCEDURE — 99999 PR PBB SHADOW E&M-EST. PATIENT-LVL III: CPT | Mod: PBBFAC,,, | Performed by: SURGERY

## 2023-09-22 PROCEDURE — 94729 DIFFUSING CAPACITY: CPT | Mod: PBBFAC | Performed by: INTERNAL MEDICINE

## 2023-09-22 PROCEDURE — 99999 PR PBB SHADOW E&M-EST. PATIENT-LVL II: ICD-10-PCS | Mod: PBBFAC,,, | Performed by: OPHTHALMOLOGY

## 2023-09-22 PROCEDURE — 3079F PR MOST RECENT DIASTOLIC BLOOD PRESSURE 80-89 MM HG: ICD-10-PCS | Mod: CPTII,,, | Performed by: INTERNAL MEDICINE

## 2023-09-22 PROCEDURE — 1159F PR MEDICATION LIST DOCUMENTED IN MEDICAL RECORD: ICD-10-PCS | Mod: CPTII,,, | Performed by: OPHTHALMOLOGY

## 2023-09-22 PROCEDURE — 1160F PR REVIEW ALL MEDS BY PRESCRIBER/CLIN PHARMACIST DOCUMENTED: ICD-10-PCS | Mod: CPTII,,, | Performed by: OPHTHALMOLOGY

## 2023-09-22 PROCEDURE — 3079F DIAST BP 80-89 MM HG: CPT | Mod: CPTII,,, | Performed by: INTERNAL MEDICINE

## 2023-09-22 PROCEDURE — 92134 OCT, RETINA - OU - BOTH EYES: ICD-10-PCS | Mod: 26,S$PBB,, | Performed by: OPHTHALMOLOGY

## 2023-09-22 PROCEDURE — 3075F PR MOST RECENT SYSTOLIC BLOOD PRESS GE 130-139MM HG: ICD-10-PCS | Mod: CPTII,,, | Performed by: INTERNAL MEDICINE

## 2023-09-22 PROCEDURE — 92014 COMPRE OPH EXAM EST PT 1/>: CPT | Mod: S$PBB,,, | Performed by: OPHTHALMOLOGY

## 2023-09-22 PROCEDURE — 94726 PLETHYSMOGRAPHY LUNG VOLUMES: CPT | Mod: PBBFAC | Performed by: INTERNAL MEDICINE

## 2023-09-22 NOTE — PROGRESS NOTES
Subjective:      Patient ID: Rakel Mims is a 48 y.o. female.    Chief Complaint: Shortness of Breath    48 year old female with no significant past medical history presents to pulmonary clinic as a referral from the emergency room. The patient presented to the ER with complaints of back pain and apparent shortness of breath. She had multiple XR performed that were negative for any acute findings. She did have anemia with mild elevated bilirubinemia with electrophoresis confirming HbSC disease. She was sent to pulmonology and heme/onc for follow up.     Currently, she has no respiratory complaints and has not had any before. She does not complain of shortness of breath, coughing, wheezing or any other issues. She vaped for two years in the past, quitting a few years ago.    Shortness of Breath  Pertinent negatives include no sputum production or wheezing.   Review of Systems   Respiratory:  Negative for cough, sputum production, choking, shortness of breath, wheezing, pleurisy, dyspnea on extertion and use of rescue inhaler.    All other systems reviewed and are negative.    Objective:     Physical Exam   Constitutional: She is oriented to person, place, and time. She appears well-developed and well-nourished. No distress.   HENT:   Head: Normocephalic.   Cardiovascular: Normal rate, regular rhythm and normal heart sounds.   Pulmonary/Chest: Normal expansion, symmetric chest wall expansion, effort normal and breath sounds normal. No stridor.   Neurological: She is alert and oriented to person, place, and time.   Skin: Skin is warm and dry.   Psychiatric: She has a normal mood and affect. Her behavior is normal.   Nursing note and vitals reviewed.    Personal Diagnostic Review    CXR 8/28/23:  FINDINGS:  Surgical clips right upper quadrant.     There is no consolidation, effusion, or pneumothorax.     Cardiomediastinal silhouette is unremarkable.     Regional osseous structures are similar to prior.    "  Impression:     No acute cardiopulmonary process.        9/22/2023     2:52 PM 8/28/2023     9:02 PM 8/28/2023     7:08 PM 8/28/2023     7:07 PM 6/12/2023    11:30 AM 6/12/2023     8:09 AM 4/21/2023     1:02 PM   Pulmonary Function Tests   SpO2  98 % 100 %  100 % 99 % 99 %   Height 5' 3" (1.6 m)   5' 3" (1.6 m)  5' 3" (1.6 m)    Weight 83 kg (182 lb 15.7 oz)   77.1 kg (170 lb)  79.4 kg (175 lb)    BMI (Calculated) 32.4   30.1  31         Assessment:     1. Shortness of breath         Outpatient Encounter Medications as of 9/22/2023   Medication Sig Dispense Refill    albuterol (PROVENTIL/VENTOLIN HFA) 90 mcg/actuation inhaler Inhale 2 puffs into the lungs every 6 (six) hours as needed for Wheezing or Shortness of Breath. 6.7 g 0    ALPRAZolam (XANAX) 0.5 MG tablet Take 1 tablet (0.5 mg total) by mouth nightly as needed for Insomnia or Anxiety. 30 tablet 1    doxepin (SINEQUAN) 25 MG capsule Take 1 capsule (25 mg total) by mouth every evening. 30 capsule 1    meclizine (ANTIVERT) 25 mg tablet Take 25 mg by mouth.      naproxen (NAPROSYN) 375 MG tablet Take 1 tablet (375 mg total) by mouth 2 (two) times daily as needed (pain). 20 tablet 0    ondansetron (ZOFRAN) 4 MG tablet Take 1 tablet (4 mg total) by mouth every 6 (six) hours as needed for Nausea. 30 tablet 1     No facility-administered encounter medications on file as of 9/22/2023.     No orders of the defined types were placed in this encounter.      Plan:     Problem List Items Addressed This Visit          Pulmonary    Shortness of breath    Current Assessment & Plan     Pt does not describe any shortness of breath in the office, on exertion, or at any other time. She has no complaints today with an essentially normal CXR and grossly normal PFTs. There is no indication for follow-up or intervention from a pulmonary standpoint at this time. She is free to follow up here as she needs.  - no intervention  - no follow-up  - pt expressed understanding           "

## 2023-09-23 NOTE — PROGRESS NOTES
HPI    DLS 07/28/2023 by Dr. FAINA Lee MD    CC : pt c/o dry eyes     -blurred day  -diplopia  -eye pain   -flashes/ floaters    Eye Meds: AT's OU prn     POHx:   MILD NS  OU   NVS OU     Last edited by Geno Irizarry MA on 9/22/2023  1:22 PM.          A/P    ICD-10-CM ICD-9-CM   1. 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 Vitreous hemorrhage, bilateral  H43.13 379.23   3. Age-related nuclear cataract of both eyes  H25.13 366.16          1. 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 Vitreous hemorrhage, bilateral  ED f/u for new vision loss  Has sickle trait  Noticed decline in vision OD last week    OD: s/p UYKI 4/25/23  Pre-Op VA HM stable, older heme now but still dense throughout, B scan retina flat    given persistent hemorrhage, needs vitrectomy, planning for 6/12/23 under mac/block    Postop: s/p PPV/EL/AFx (Date 6/12/23 by Rosa/Brett) for [facility] OD  Positioning: Upright  Duration: 5 days    9/22/2023 VA 20/20 (was 20/25), IOP 10, heme resolved    Plan: heme resolved, NV regressed, good PRP, observe, no injection     Instructions reviewed   - RD precautions  - Return for increasing pain/decreasing vision           OS: s/p PRP 5/9/23  VA 20/20, peripheral regressed NV, old heme inferiorly, peripheral laser   Plan: no active NV, observe no injection       3. Age-related nuclear cataract of both eyes  Mild NS, NVS  Plan: Observation Update Mrx   prn      RTC 6 mo DFE/OCTm OU      I saw and examined the patient and reviewed in detail the findings documented. The final examination findings, image interpretations, and plan as documented in the record represent my personal judgment and conclusions.    Yo Lee MD  Vitreoretinal Surgery   [facility]

## 2023-09-24 LAB
DLCO ADJ PRE: 19.17 ML/(MIN*MMHG) (ref 18.15–29.61)
DLCO SINGLE BREATH LLN: 18.15
DLCO SINGLE BREATH PRE REF: 74 %
DLCO SINGLE BREATH REF: 23.88
DLCOC SBVA LLN: 3.42
DLCOC SBVA PRE REF: 95.6 %
DLCOC SBVA REF: 5.01
DLCOC SINGLE BREATH LLN: 18.15
DLCOC SINGLE BREATH PRE REF: 80.3 %
DLCOC SINGLE BREATH REF: 23.88
DLCOCSBVAULN: 6.59
DLCOCSINGLEBREATHULN: 29.61
DLCOSINGLEBREATHULN: 29.61
DLCOVA LLN: 3.42
DLCOVA PRE REF: 88.1 %
DLCOVA PRE: 4.41 ML/(MIN*MMHG*L) (ref 3.42–6.59)
DLCOVA REF: 5.01
DLCOVAULN: 6.59
DLVAADJ PRE: 4.79 ML/(MIN*MMHG*L) (ref 3.42–6.59)
ERV LLN: -16449.03
ERV PRE REF: 47.4 %
ERV REF: 0.97
ERVULN: ABNORMAL
FEF 25 75 LLN: 1.19
FEF 25 75 PRE REF: 99.5 %
FEF 25 75 REF: 2.41
FET100 CHG: -3.4 %
FEV05 LLN: 1.11
FEV05 REF: 1.96
FEV1 CHG: -3.2 %
FEV1 FVC LLN: 71
FEV1 FVC PRE REF: 100.5 %
FEV1 FVC REF: 81
FEV1 LLN: 1.77
FEV1 PRE REF: 107.4 %
FEV1 REF: 2.34
FEV1 VOL CHG: -0.08
FRCPLETH LLN: 1.81
FRCPLETH PREREF: 68.5 %
FRCPLETH REF: 2.63
FRCPLETHULN: 3.45
FVC CHG: -2.1 %
FVC LLN: 2.21
FVC PRE REF: 106.5 %
FVC REF: 2.88
FVC VOL CHG: -0.07
IVC PRE: 2.86 L (ref 2.21–3.58)
IVC SINGLE BREATH LLN: 2.21
IVC SINGLE BREATH PRE REF: 99.2 %
IVC SINGLE BREATH REF: 2.88
IVCSINGLEBREATHULN: 3.58
LLN IC: -16447.94
PEF LLN: 4.18
PEF PRE REF: 70.8 %
PEF REF: 6.12
PHYSICIAN COMMENT: ABNORMAL
POST FEF 25 75: 2.42 L/S (ref 1.19–4.03)
POST FET 100: 5.94 SEC
POST FEV1 FVC: 80.85 % (ref 70.53–90.41)
POST FEV1: 2.43 L (ref 1.77–2.88)
POST FEV5: 1.78 L (ref 1.11–2.82)
POST FVC: 3 L (ref 2.21–3.58)
POST PEF: 4.67 L/S (ref 4.18–8.07)
PRE DLCO: 17.66 ML/(MIN*MMHG) (ref 18.15–29.61)
PRE ERV: 0.46 L (ref -16449.03–16450.97)
PRE FEF 25 75: 2.4 L/S (ref 1.19–4.03)
PRE FET 100: 6.15 SEC
PRE FEV05 REF: 88.6 %
PRE FEV1 FVC: 81.72 % (ref 70.53–90.41)
PRE FEV1: 2.51 L (ref 1.77–2.88)
PRE FEV5: 1.74 L (ref 1.11–2.82)
PRE FRC PL: 1.8 L (ref 1.81–3.45)
PRE FVC: 3.07 L (ref 2.21–3.58)
PRE IC: 2.61 L (ref -16447.94–16452.06)
PRE PEF: 4.34 L/S (ref 4.18–8.07)
PRE REF IC: 126.9 %
PRE RV: 1.34 L (ref 1.09–2.24)
PRE TLC: 4.41 L (ref 3.78–5.76)
RAW PRE REF: 100.3 %
RAW PRE: 3.07 CMH2O*S/L (ref 3.06–3.06)
RAW REF: 3.06
REF IC: 2.06
RV LLN: 1.09
RV PRE REF: 80.7 %
RV REF: 1.66
RVTLC LLN: 26
RVTLC PRE REF: 86.3 %
RVTLC PRE: 30.44 % (ref 25.69–44.87)
RVTLC REF: 35
RVTLCULN: 45
RVULN: 2.24
SGAW PRE REF: 129.4 %
SGAW PRE: 0.13 1/(CMH2O*S) (ref 0.1–0.1)
SGAW REF: 0.1
TLC LLN: 3.78
TLC PRE REF: 92.5 %
TLC REF: 4.77
TLC ULN: 5.76
ULN IC: ABNORMAL
VA PRE: 4 L (ref 4.62–4.62)
VA SINGLE BREATH LLN: 4.62
VA SINGLE BREATH PRE REF: 86.7 %
VA SINGLE BREATH REF: 4.62
VASINGLEBREATHULN: 4.62
VC LLN: 2.21
VC PRE REF: 106.5 %
VC PRE: 3.07 L (ref 2.21–3.58)
VC REF: 2.88
VC ULN: 3.58

## 2023-09-24 PROCEDURE — 94729 DIFFUSING CAPACITY: CPT | Mod: 26,S$PBB,, | Performed by: INTERNAL MEDICINE

## 2023-09-24 PROCEDURE — 94729 PR C02/MEMBANE DIFFUSE CAPACITY: ICD-10-PCS | Mod: 26,S$PBB,, | Performed by: INTERNAL MEDICINE

## 2023-09-24 PROCEDURE — 94726 PLETHYSMOGRAPHY LUNG VOLUMES: CPT | Mod: 26,S$PBB,, | Performed by: INTERNAL MEDICINE

## 2023-09-24 PROCEDURE — 94060 EVALUATION OF WHEEZING: CPT | Mod: 26,S$PBB,, | Performed by: INTERNAL MEDICINE

## 2023-09-24 PROCEDURE — 94060 PR EVAL OF BRONCHOSPASM: ICD-10-PCS | Mod: 26,S$PBB,, | Performed by: INTERNAL MEDICINE

## 2023-09-24 PROCEDURE — 94726 PULM FUNCT TST PLETHYSMOGRAP: ICD-10-PCS | Mod: 26,S$PBB,, | Performed by: INTERNAL MEDICINE

## 2023-09-25 NOTE — ASSESSMENT & PLAN NOTE
Pt does not describe any shortness of breath in the office, on exertion, or at any other time. She has no complaints today with an essentially normal CXR and grossly normal PFTs. There is no indication for follow-up or intervention from a pulmonary standpoint at this time. She is free to follow up here as she needs.  - no intervention  - no follow-up  - pt expressed understanding

## 2023-10-18 ENCOUNTER — HOSPITAL ENCOUNTER (EMERGENCY)
Facility: OTHER | Age: 48
Discharge: HOME | End: 2023-10-18
Attending: EMERGENCY MEDICINE
Payer: MEDICAID

## 2023-10-18 VITALS
HEART RATE: 82 BPM | SYSTOLIC BLOOD PRESSURE: 142 MMHG | DIASTOLIC BLOOD PRESSURE: 68 MMHG | WEIGHT: 180 LBS | TEMPERATURE: 98 F | HEIGHT: 63 IN | RESPIRATION RATE: 18 BRPM | OXYGEN SATURATION: 99 % | BODY MASS INDEX: 31.89 KG/M2

## 2023-10-18 DIAGNOSIS — B34.9 ACUTE VIRAL SYNDROME: Primary | ICD-10-CM

## 2023-10-18 DIAGNOSIS — Z75.8 DOES NOT HAVE PRIMARY CARE PROVIDER: ICD-10-CM

## 2023-10-18 DIAGNOSIS — R11.0 NAUSEA: ICD-10-CM

## 2023-10-18 DIAGNOSIS — R11.2 NAUSEA AND VOMITING, UNSPECIFIED VOMITING TYPE: ICD-10-CM

## 2023-10-18 LAB
ALBUMIN SERPL BCP-MCNC: 4.4 G/DL (ref 3.5–5.2)
ALP SERPL-CCNC: 51 U/L (ref 55–135)
ALT SERPL W/O P-5'-P-CCNC: 16 U/L (ref 10–44)
ANION GAP SERPL CALC-SCNC: 10 MMOL/L (ref 8–16)
AST SERPL-CCNC: 15 U/L (ref 10–40)
BASOPHILS # BLD AUTO: 0.01 K/UL (ref 0–0.2)
BASOPHILS NFR BLD: 0.2 % (ref 0–1.9)
BILIRUB SERPL-MCNC: 3.2 MG/DL (ref 0.1–1)
BILIRUB UR QL STRIP: NEGATIVE
BUN SERPL-MCNC: 8 MG/DL (ref 6–20)
CALCIUM SERPL-MCNC: 9.4 MG/DL (ref 8.7–10.5)
CHLORIDE SERPL-SCNC: 104 MMOL/L (ref 95–110)
CLARITY UR: CLEAR
CO2 SERPL-SCNC: 22 MMOL/L (ref 23–29)
COLOR UR: YELLOW
CREAT SERPL-MCNC: 0.9 MG/DL (ref 0.5–1.4)
CTP QC/QA: YES
CTP QC/QA: YES
DIFFERENTIAL METHOD: ABNORMAL
EOSINOPHIL # BLD AUTO: 0 K/UL (ref 0–0.5)
EOSINOPHIL NFR BLD: 0.5 % (ref 0–8)
ERYTHROCYTE [DISTWIDTH] IN BLOOD BY AUTOMATED COUNT: 14 % (ref 11.5–14.5)
EST. GFR  (NO RACE VARIABLE): >60 ML/MIN/1.73 M^2
GLUCOSE SERPL-MCNC: 95 MG/DL (ref 70–110)
GLUCOSE UR QL STRIP: NEGATIVE
HCT VFR BLD AUTO: 35.5 % (ref 37–48.5)
HGB BLD-MCNC: 12.8 G/DL (ref 12–16)
HGB UR QL STRIP: ABNORMAL
IMM GRANULOCYTES # BLD AUTO: 0.01 K/UL (ref 0–0.04)
IMM GRANULOCYTES NFR BLD AUTO: 0.2 % (ref 0–0.5)
KETONES UR QL STRIP: ABNORMAL
LEUKOCYTE ESTERASE UR QL STRIP: NEGATIVE
LIPASE SERPL-CCNC: 26 U/L (ref 4–60)
LYMPHOCYTES # BLD AUTO: 0.5 K/UL (ref 1–4.8)
LYMPHOCYTES NFR BLD: 11.2 % (ref 18–48)
MCH RBC QN AUTO: 27.2 PG (ref 27–31)
MCHC RBC AUTO-ENTMCNC: 36.1 G/DL (ref 32–36)
MCV RBC AUTO: 76 FL (ref 82–98)
MONOCYTES # BLD AUTO: 0.3 K/UL (ref 0.3–1)
MONOCYTES NFR BLD: 6.2 % (ref 4–15)
NEUTROPHILS # BLD AUTO: 3.3 K/UL (ref 1.8–7.7)
NEUTROPHILS NFR BLD: 81.7 % (ref 38–73)
NITRITE UR QL STRIP: NEGATIVE
NRBC BLD-RTO: 0 /100 WBC
PH UR STRIP: 6 [PH] (ref 5–8)
PLATELET # BLD AUTO: 118 K/UL (ref 150–450)
PMV BLD AUTO: 9.4 FL (ref 9.2–12.9)
POC MOLECULAR INFLUENZA A AGN: NEGATIVE
POC MOLECULAR INFLUENZA B AGN: NEGATIVE
POTASSIUM SERPL-SCNC: 3.1 MMOL/L (ref 3.5–5.1)
PROT SERPL-MCNC: 7.6 G/DL (ref 6–8.4)
PROT UR QL STRIP: NEGATIVE
RBC # BLD AUTO: 4.7 M/UL (ref 4–5.4)
SARS-COV-2 RDRP RESP QL NAA+PROBE: NEGATIVE
SODIUM SERPL-SCNC: 136 MMOL/L (ref 136–145)
SP GR UR STRIP: 1.01 (ref 1–1.03)
URN SPEC COLLECT METH UR: ABNORMAL
UROBILINOGEN UR STRIP-ACNC: ABNORMAL EU/DL
WBC # BLD AUTO: 4.03 K/UL (ref 3.9–12.7)

## 2023-10-18 PROCEDURE — 93010 ELECTROCARDIOGRAM REPORT: CPT | Mod: ,,, | Performed by: INTERNAL MEDICINE

## 2023-10-18 PROCEDURE — 85025 COMPLETE CBC W/AUTO DIFF WBC: CPT | Performed by: PHYSICIAN ASSISTANT

## 2023-10-18 PROCEDURE — 87635 SARS-COV-2 COVID-19 AMP PRB: CPT | Performed by: NURSE PRACTITIONER

## 2023-10-18 PROCEDURE — 81003 URINALYSIS AUTO W/O SCOPE: CPT | Performed by: PHYSICIAN ASSISTANT

## 2023-10-18 PROCEDURE — 25000003 PHARM REV CODE 250

## 2023-10-18 PROCEDURE — 96361 HYDRATE IV INFUSION ADD-ON: CPT

## 2023-10-18 PROCEDURE — 63600175 PHARM REV CODE 636 W HCPCS

## 2023-10-18 PROCEDURE — 96375 TX/PRO/DX INJ NEW DRUG ADDON: CPT

## 2023-10-18 PROCEDURE — 93005 ELECTROCARDIOGRAM TRACING: CPT

## 2023-10-18 PROCEDURE — 93010 EKG 12-LEAD: ICD-10-PCS | Mod: ,,, | Performed by: INTERNAL MEDICINE

## 2023-10-18 PROCEDURE — 80053 COMPREHEN METABOLIC PANEL: CPT | Performed by: PHYSICIAN ASSISTANT

## 2023-10-18 PROCEDURE — 83690 ASSAY OF LIPASE: CPT | Performed by: PHYSICIAN ASSISTANT

## 2023-10-18 PROCEDURE — 99284 EMERGENCY DEPT VISIT MOD MDM: CPT | Mod: 25

## 2023-10-18 PROCEDURE — 96374 THER/PROPH/DIAG INJ IV PUSH: CPT

## 2023-10-18 RX ORDER — POTASSIUM CHLORIDE 20 MEQ/1
20 TABLET, EXTENDED RELEASE ORAL
Status: COMPLETED | OUTPATIENT
Start: 2023-10-18 | End: 2023-10-18

## 2023-10-18 RX ORDER — GUAIFENESIN 600 MG/1
600 TABLET, EXTENDED RELEASE ORAL 2 TIMES DAILY
Qty: 10 TABLET | Refills: 0 | Status: SHIPPED | OUTPATIENT
Start: 2023-10-18 | End: 2023-10-23

## 2023-10-18 RX ORDER — KETOROLAC TROMETHAMINE 30 MG/ML
15 INJECTION, SOLUTION INTRAMUSCULAR; INTRAVENOUS
Status: COMPLETED | OUTPATIENT
Start: 2023-10-18 | End: 2023-10-18

## 2023-10-18 RX ORDER — ACETAMINOPHEN 325 MG/1
650 TABLET ORAL
Status: COMPLETED | OUTPATIENT
Start: 2023-10-18 | End: 2023-10-18

## 2023-10-18 RX ORDER — FLUTICASONE PROPIONATE 50 MCG
1 SPRAY, SUSPENSION (ML) NASAL 2 TIMES DAILY PRN
Qty: 15 G | Refills: 0 | Status: SHIPPED | OUTPATIENT
Start: 2023-10-18 | End: 2023-10-23

## 2023-10-18 RX ORDER — ONDANSETRON 2 MG/ML
8 INJECTION INTRAMUSCULAR; INTRAVENOUS
Status: COMPLETED | OUTPATIENT
Start: 2023-10-18 | End: 2023-10-18

## 2023-10-18 RX ORDER — ONDANSETRON 4 MG/1
4 TABLET, ORALLY DISINTEGRATING ORAL EVERY 6 HOURS PRN
Qty: 20 TABLET | Refills: 0 | Status: SHIPPED | OUTPATIENT
Start: 2023-10-18 | End: 2023-10-23

## 2023-10-18 RX ORDER — IBUPROFEN 600 MG/1
600 TABLET ORAL EVERY 6 HOURS PRN
Qty: 20 TABLET | Refills: 0 | Status: SHIPPED | OUTPATIENT
Start: 2023-10-18

## 2023-10-18 RX ADMIN — KETOROLAC TROMETHAMINE 15 MG: 30 INJECTION, SOLUTION INTRAMUSCULAR at 06:10

## 2023-10-18 RX ADMIN — ACETAMINOPHEN 650 MG: 325 TABLET, FILM COATED ORAL at 06:10

## 2023-10-18 RX ADMIN — POTASSIUM CHLORIDE 20 MEQ: 1500 TABLET, EXTENDED RELEASE ORAL at 07:10

## 2023-10-18 RX ADMIN — ONDANSETRON 8 MG: 2 INJECTION INTRAMUSCULAR; INTRAVENOUS at 06:10

## 2023-10-18 RX ADMIN — SODIUM CHLORIDE 1000 ML: 0.9 INJECTION, SOLUTION INTRAVENOUS at 06:10

## 2023-10-18 NOTE — FIRST PROVIDER EVALUATION
Emergency Department TeleTriage Encounter Note      CHIEF COMPLAINT    Chief Complaint   Patient presents with    Hypertension     Pt c/o elevated BPs at home 190s/120s, flu like symptoms, and SCC with pain to back, neck and arms. States she usually doesn't need to take many meds for her sickle cell at home.    Vomiting    Sickle Cell Pain Crisis       VITAL SIGNS   Initial Vitals [10/18/23 1656]   BP Pulse Resp Temp SpO2   136/76 78 16 98.1 °F (36.7 °C) 97 %      MAP       --            ALLERGIES    Review of patient's allergies indicates:   Allergen Reactions    Reglan [metoclopramide hcl] Hallucinations    Metoclopramide      Other reaction(s): Other (See Comments)       PROVIDER TRIAGE NOTE  Patient presents with complaint of flu-like symptoms.  She also reports nausea, vomiting and diarrhea.  She feels as if she is dehydrated.  She also states her sickle cell trait is acting up.  She reports her urine is warmth.      Phy:   Constitutional: well nourished, well developed, appearing stated age, NAD        Initial orders will be placed and care will be transferred to an alternate provider when patient is roomed for a full evaluation. Any additional orders and the final disposition will be determined by that provider.        ORDERS  Labs Reviewed   SARS-COV-2 RDRP GENE   POCT INFLUENZA A/B MOLECULAR       ED Orders (720h ago, onward)      Start Ordered     Status Ordering Provider    10/18/23 1659 10/18/23 1658  POCT Influenza A/B Molecular  Once         Ordered LADI BORGES    10/18/23 1658 10/18/23 1658  POCT COVID-19 Rapid Screening  Once         Ordered LADI BORGES              Virtual Visit Note: The provider triage portion of this emergency department evaluation and documentation was performed via [employer] a HIPAA-compliant telemedicine application, in concert with a tele-presenter in the room. A face to face patient evaluation with one of my colleagues will occur once the patient is placed in  an emergency department room.      DISCLAIMER: This note was prepared with [employer] voice recognition transcription software. Garbled syntax, mangled pronouns, and other bizarre constructions may be attributed to that software system.

## 2023-10-18 NOTE — ED PROVIDER NOTES
"Encounter Date: 10/18/2023       History     Chief Complaint   Patient presents with    Hypertension     Pt c/o elevated BPs at home 190s/120s, flu like symptoms, and SCC with pain to back, neck and arms. States she usually doesn't need to take many meds for her sickle cell at home.    Vomiting    Sickle Cell Pain Crisis     Rakel Mims is a 48 y.o. female with history of hypertension, sickle cell trait, bipolar disorder, and iron-deficiency anemia presenting to the emergency department for evaluation of cold symptoms for the past few days.  Patient states that she is experiencing generalized weakness, headache, fever, sinus pressure and pain, cough, congestion, myalgias, and back pain.  Notes her temperature was "101" the past two days. She is unsure if her back pain is being caused by her sickle cell trait.  She also reports nausea and multiple episodes of nonbloody emesis for the past 2 days.  States that she is been unable to tolerate liquids or solids.  Patient states that she has been voiding more but notes that she also has been drinking more fluids to try to clear her sinuses.  She denies chest pain, shortness of breath, palpitations, leg swelling, abdominal pain, diarrhea, constipation, urinary urgency, dysuria, hematuria, vaginal bleeding, or vaginal discharge.  No treatments for her symptoms prior to arrival.    Patient also reports elevated blood pressure "for a while".  Patient states that she used to take hydrochlorothiazide but stopped taking it over a year ago because it was not helping with her blood pressure.  Reports that she just moved back to [place] from [place].  She is currently in the process of establishing care with a new primary care provider here to manage her chronic conditions.      The history is provided by the patient.     Review of patient's allergies indicates:   Allergen Reactions    Reglan [metoclopramide hcl] Hallucinations    Metoclopramide      Other reaction(s): " Other (See Comments)     Past Medical History:   Diagnosis Date    Age-related nuclear cataract of both eyes 4/25/2023    Bipolar 1 disorder     Hypertension     Iron deficiency anemia 6/4/2018    Sickle cell trait      Past Surgical History:   Procedure Laterality Date    ABLATION OF ENDOMETRIUM USING RESECTOSCOPE  9/10/2018    CHOLECYSTECTOMY      DILATION AND CURETTAGE OF UTERUS  9/10/2018    HYSTEROSCOPY WITH DILATION AND CURETTAGE OF UTERUS N/A 9/10/2018    TUBAL LIGATION      VITRECTOMY BY PARS PLANA APPROACH Right 6/12/2023    Procedure: VITRECTOMY, PARS PLANA APPROACH;  Surgeon: Yo Lee MD;  Location: [facility] OR [address] [place] [address];  Service: Ophthalmology;  Laterality: Right;  Mac block     Family History   Problem Relation Age of Onset    Hypertension Mother     Heart failure Father     Hypertension Father     Ulcers Father     Hypertension Sister     ADD / ADHD Daughter     ADD / ADHD Son     No Known Problems Brother     No Known Problems Maternal Aunt     No Known Problems Maternal Uncle     No Known Problems Paternal Aunt     No Known Problems Paternal Uncle     No Known Problems Maternal Grandmother     No Known Problems Maternal Grandfather     No Known Problems Paternal Grandmother     No Known Problems Paternal Grandfather     Breast cancer Neg Hx     Colon cancer Neg Hx     Ovarian cancer Neg Hx     Amblyopia Neg Hx     Blindness Neg Hx     Cancer Neg Hx     Cataracts Neg Hx     Diabetes Neg Hx     Glaucoma Neg Hx     Macular degeneration Neg Hx     Retinal detachment Neg Hx     Strabismus Neg Hx     Stroke Neg Hx     Thyroid disease Neg Hx      Social History     Tobacco Use    Smoking status: Never    Smokeless tobacco: Never   Substance Use Topics    Alcohol use: Yes     Comment: occasionally    Drug use: No     Review of Systems   Constitutional:  Positive for fever. Negative for chills.   HENT:  Positive for congestion, sinus pressure and sinus pain. Negative for rhinorrhea and sore throat.     Respiratory:  Positive for cough. Negative for shortness of breath.    Cardiovascular:  Negative for chest pain, palpitations and leg swelling.   Gastrointestinal:  Positive for nausea and vomiting. Negative for abdominal pain, constipation and diarrhea.   Genitourinary:  Negative for dysuria, frequency and urgency.   Musculoskeletal:  Positive for back pain and myalgias.   Skin:  Negative for rash.   Neurological:  Positive for weakness and headaches. Negative for dizziness.   Psychiatric/Behavioral:  Negative for confusion.        Physical Exam     Initial Vitals [10/18/23 1656]   BP Pulse Resp Temp SpO2   136/76 78 16 98.1 °F (36.7 °C) 97 %      MAP       --         Physical Exam    Nursing note and vitals reviewed.  Constitutional: She appears well-developed and well-nourished. No distress.   HENT:   Head: Normocephalic and atraumatic.   Right Ear: Tympanic membrane, external ear and ear canal normal.   Left Ear: Tympanic membrane, external ear and ear canal normal.   Nose: Nose normal.   Mouth/Throat: Oropharynx is clear and moist.   Eyes: Conjunctivae and EOM are normal.   Neck: Neck supple.   Normal range of motion.  Cardiovascular:  Normal rate, regular rhythm, normal heart sounds and intact distal pulses.           Pulmonary/Chest: Breath sounds normal. No respiratory distress. She has no wheezes. She has no rhonchi. She has no rales. She exhibits no tenderness.   Abdominal: Abdomen is soft. Bowel sounds are normal. She exhibits no distension and no mass. There is no abdominal tenderness. There is no rebound and no guarding.   Musculoskeletal:         General: No edema. Normal range of motion.      Cervical back: Normal range of motion and neck supple.      Comments: No leg edema.      Neurological: She is alert and oriented to person, place, and time. She has normal strength.   Skin: Skin is warm and dry.   Psychiatric: She has a normal mood and affect. Her behavior is normal. Judgment and thought  content normal.         ED Course   Procedures  Labs Reviewed   CBC W/ AUTO DIFFERENTIAL - Abnormal; Notable for the following components:       Result Value    Hematocrit 35.5 (*)     MCV 76 (*)     MCHC 36.1 (*)     Platelets 118 (*)     Lymph # 0.5 (*)     Gran % 81.7 (*)     Lymph % 11.2 (*)     All other components within normal limits   COMPREHENSIVE METABOLIC PANEL - Abnormal; Notable for the following components:    Potassium 3.1 (*)     CO2 22 (*)     Total Bilirubin 3.2 (*)     Alkaline Phosphatase 51 (*)     All other components within normal limits   URINALYSIS, REFLEX TO URINE CULTURE - Abnormal; Notable for the following components:    Ketones, UA 2+ (*)     Occult Blood UA Trace (*)     Urobilinogen, UA 2.0-3.0 (*)     All other components within normal limits    Narrative:     Specimen Source->Urine   LIPASE   SARS-COV-2 RDRP GENE   POCT INFLUENZA A/B MOLECULAR        ECG Results              EKG 12-lead (Final result)  Result time 10/19/23 15:35:58      Final result by Interface, Lab In [facility] (10/19/23 15:35:58)                   Narrative:    Test Reason : R11.0,    Vent. Rate : 060 BPM     Atrial Rate : 060 BPM     P-R Int : 134 ms          QRS Dur : 084 ms      QT Int : 402 ms       P-R-T Axes : 114 042 020 degrees     QTc Int : 402 ms    Normal sinus rhythm  Cannot rule out Anterior infarct ,age undetermined  Nonspecific T wave abnormality    Confirmed by Griffin ABREU, Dane ([id number]) on 10/19/2023 3:35:52 PM    Referred By: [email or web address]   SELF           Confirmed By:Dane Moya MD                                  Imaging Results    None          Medications   sodium chloride 0.9% bolus 1,000 mL 1,000 mL (0 mLs Intravenous Stopped 10/18/23 1943)   ondansetron injection 8 mg (8 mg Intravenous Given 10/18/23 1841)   ketorolac injection 15 mg (15 mg Intravenous Given 10/18/23 1844)   acetaminophen tablet 650 mg (650 mg Oral Given 10/18/23 1846)   potassium chloride SA CR tablet 20 mEq (20 mEq  Oral Given 10/18/23 1948)     Medical Decision Making  Risk  OTC drugs.  Prescription drug management.               ED Course as of 10/20/23 1453   Wed Oct 18, 2023   1715 EKG normal sinus rhythm with a rate of 60.  Low voltage. Normal intervals.  Normal axis.  Nonspecific ST changes.  No STEMI on my individual interpretation [BD]      ED Course User Index  [BD] Bhavesh Coronel MD               Medical Decision Making:   Initial Assessment:   Urgent evaluation of 48 y.o. female with history of hypertension, sickle cell trait, bipolar disorder, and iron-deficiency anemia presenting to the emergency department for evaluation of cold symptoms for the past few days. She has been experiencing generalized weakness, headache, fever, sinus pressure and pain, cough, congestion, myalgias, and back pain. Also reports elevated blood pressure for a while. Was prescribed HCTZ in past but stopped taking it over a year ago because it was not working for her. Does not have PCP.  On exam, she is uncomfortable appearing but nontoxic.  Hemodynamically stable.  Afebrile in the ED today.  Pending labs ordered by the triage provider.  Will give IV fluids, Zofran, Toradol, and Tylenol.  Clinical Tests:   Lab Tests: Ordered and Reviewed  ED Management:  On review of labs, no leukocytosis.  Hemoglobin is stable.  Mild hypokalemia at 3.1 today likely due to volume loss from nausea and vomiting.  Was replenished with oral potassium.  No other electrolyte derangement.  Kidney and liver function are within normal limits.  Lipase is also within normal limits.  No nitrites or leukocytes on UA.  Rapid COVID and influenza tests are negative.  I updated the patient on all results.  Explained that she is likely experiencing viral syndrome.  Discharged home with ibuprofen, Zofran, Mucinex, and Flonase.  Recommended increasing her fluid intake and resting as much as possible.  Patient verbalized understanding and agreement with this plan of care. She  was given specific return precautions. Advised to follow up with PCP as needed. All questions and concerns addressed. She is stable for discharge.     This note was created with MModal Fluency Direct Dictation. Please excuse any spelling or grammatical errors.      Clinical Impression:   Final diagnoses:  [R11.0] Nausea  [B34.9] Acute viral syndrome (Primary)  [R11.2] Nausea and vomiting, unspecified vomiting type  [Z75.8] Does not have primary care provider        ED Disposition Condition    Discharge Stable          ED Prescriptions       Medication Sig Dispense Start Date End Date Auth. Provider    ondansetron (ZOFRAN-ODT) 4 MG TbDL Take 1 tablet (4 mg total) by mouth every 6 (six) hours as needed (For nausea and vomiting). Patient not taking:  Reported on 10/20/2023 20 tablet 10/18/2023 10/23/2023 Danny Patel PA-C    guaiFENesin (MUCINEX) 600 mg 12 hr tablet Take 1 tablet (600 mg total) by mouth 2 (two) times daily. for 5 days Patient not taking:  Reported on 10/20/2023 10 tablet 10/18/2023 10/23/2023 Danny Patel PA-C    fluticasone propionate (FLONASE) 50 mcg/actuation nasal spray 1 spray (50 mcg total) by Each Nostril route 2 (two) times daily as needed for Rhinitis (for nasal congestion, sinus pressure). 15 g 10/18/2023 10/23/2023 Danny Patel PA-C    ibuprofen (ADVIL,MOTRIN) 600 MG tablet Take 1 tablet (600 mg total) by mouth every 6 (six) hours as needed for Pain. 20 tablet 10/18/2023 -- Danny Patel PA-C          Follow-up Information    None          Danny Patel PA-C  10/20/23 5852

## 2023-10-18 NOTE — ED TRIAGE NOTES
Patient c/o elevated BP at home and generalized weakness and body aches. Pt also reporting sickle cell pain krista, denies taking any Rx or OTC medications at home.

## 2023-10-19 NOTE — DISCHARGE INSTRUCTIONS
Please take Zofran as needed for nausea and vomiting.  Please take ibuprofen as needed for your myalgias.  Please take Mucinex for cough.  If Mucinex does not improve your cough, you can try over-the-counter Delsym or Robitussin.  Please use Flonase for nasal congestion and runny nose.  Make sure you stay hydrated.

## 2023-10-20 ENCOUNTER — OFFICE VISIT (OUTPATIENT)
Dept: OPHTHALMOLOGY | Facility: CLINIC | Age: 48
End: 2023-10-20
Payer: MEDICAID

## 2023-10-20 DIAGNOSIS — H43.13 VITREOUS HEMORRHAGE, BILATERAL: ICD-10-CM

## 2023-10-20 DIAGNOSIS — H36.829 PROLIFERATIVE RETINOPATHY DUE TO SICKLE CELL DISEASE: Primary | ICD-10-CM

## 2023-10-20 DIAGNOSIS — D57.1 PROLIFERATIVE RETINOPATHY DUE TO SICKLE CELL DISEASE: Primary | ICD-10-CM

## 2023-10-20 DIAGNOSIS — H25.13 AGE-RELATED NUCLEAR CATARACT OF BOTH EYES: ICD-10-CM

## 2023-10-20 PROCEDURE — 67028 PR INJECT INTRAVITREAL PHARMCOLOGIC: ICD-10-PCS | Mod: S$PBB,RT,, | Performed by: OPHTHALMOLOGY

## 2023-10-20 PROCEDURE — 99211 OFF/OP EST MAY X REQ PHY/QHP: CPT | Mod: PBBFAC,25 | Performed by: OPHTHALMOLOGY

## 2023-10-20 PROCEDURE — 99999 PR PBB SHADOW E&M-EST. PATIENT-LVL I: ICD-10-PCS | Mod: PBBFAC,,, | Performed by: OPHTHALMOLOGY

## 2023-10-20 PROCEDURE — 99214 OFFICE O/P EST MOD 30 MIN: CPT | Mod: 25,S$PBB,, | Performed by: OPHTHALMOLOGY

## 2023-10-20 PROCEDURE — 1159F PR MEDICATION LIST DOCUMENTED IN MEDICAL RECORD: ICD-10-PCS | Mod: CPTII,,, | Performed by: OPHTHALMOLOGY

## 2023-10-20 PROCEDURE — 67028 INJECTION EYE DRUG: CPT | Mod: S$PBB,RT,, | Performed by: OPHTHALMOLOGY

## 2023-10-20 PROCEDURE — 76512 B SCAN: ICD-10-PCS | Mod: 26,50,S$PBB, | Performed by: OPHTHALMOLOGY

## 2023-10-20 PROCEDURE — 99214 PR OFFICE/OUTPT VISIT, EST, LEVL IV, 30-39 MIN: ICD-10-PCS | Mod: 25,S$PBB,, | Performed by: OPHTHALMOLOGY

## 2023-10-20 PROCEDURE — 1159F MED LIST DOCD IN RCRD: CPT | Mod: CPTII,,, | Performed by: OPHTHALMOLOGY

## 2023-10-20 PROCEDURE — 99999 PR PBB SHADOW E&M-EST. PATIENT-LVL I: CPT | Mod: PBBFAC,,, | Performed by: OPHTHALMOLOGY

## 2023-10-20 PROCEDURE — 67028 INJECTION EYE DRUG: CPT | Mod: PBBFAC,RT | Performed by: OPHTHALMOLOGY

## 2023-10-20 PROCEDURE — 76512 OPH US DX B-SCAN: CPT | Mod: 50,PBBFAC | Performed by: OPHTHALMOLOGY

## 2023-10-20 PROCEDURE — 1160F RVW MEDS BY RX/DR IN RCRD: CPT | Mod: CPTII,,, | Performed by: OPHTHALMOLOGY

## 2023-10-20 PROCEDURE — 1160F PR REVIEW ALL MEDS BY PRESCRIBER/CLIN PHARMACIST DOCUMENTED: ICD-10-PCS | Mod: CPTII,,, | Performed by: OPHTHALMOLOGY

## 2023-10-20 RX ADMIN — Medication 1.25 MG: at 11:10
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
## 2023-11-20 NOTE — PROGRESS NOTES
HPI    DLS 10/20/2023 by Dr. FAINA Lee MD    CC : pt c/o blurred Va OD w/ headaches       ++diplopia  ++eye pain   -flashes/ floaters  -headaches    Eye Meds: AT's OU prn     POHx:   1. MILD NS  OU   2. NVS OU     Last edited by Geno Irizarry MA on 11/21/2023 10:18 AM.           A/P    ICD-10-CM ICD-9-CM   1. 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 Vitreous hemorrhage, bilateral  H43.13 379.23   3. Age-related nuclear cataract of both eyes  H25.13 366.16         1. 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 Vitreous hemorrhage, bilateral    Has sickle trait    OD: s/p YUKI 4/25/23  Pre-Op VA HM stable, older heme now but still dense throughout, B scan retina flat    given persistent hemorrhage, needs vitrectomy, planning for 6/12/23 under mac/block    Postop: s/p PPV/EL/AFx (Date 6/12/23 by Rosa/Brett) for NCVH OD     Today 11/20/2023  VA 20/50 (Was HM), improved view but still hazy with scant heme, pt living situation better- moved out of home    Plan: given improving heme, recommend Avastin OD for VH, pt wishes to proceed.   Based on todays exam, diagnostic studies, and review of records, the determination was made for treatment today.  Schedule Avastin Injection today Right Eye Patient chooses to proceed with injection R/B/A discussed include infection retinal detachment and stroke    Patient identified.  Timeout performed.    Risks, benefits, and alternatives to treatment were discussed in detail with the patient, including bleeding/infection (endophthalmitis)/etc.  The patient voiced understanding and wished to proceed with the procedure.  See separate consent form.    Injection Procedure Note:  Diagnosis: VH Right Eye    Topical Proparacaine drop placed then topical 5% Betadine and then subconjunctival lidocaine 2% injection  Sterile gloves used, and sterile lid speculum placed.  5% Betadine placed at injection site again prior to  injection.  Avastin 1.25mg in 0.05cc Injected inferotemporally 3.5-4mm posterior to the limbus.  Complications: None  Va at least CF at 5 feet post injection.  Retina, ONH, IOP normal after injection.    Followup as below.  Patient should return immediately PRN.  Retinal Detachment and Endophthalmitis precautions given.     SLEEP HEAD OF BED ELEVATED     OS: s/p PRP 5/9/23  VA 20/20 (stable), peripheral regressed NV, old heme inferiorly, peripheral laser   Plan: no active NV, observe no injection  inactive        3. Age-related nuclear cataract of both eyes  Mild NS, NVS  Plan: Observation      RTC 1 mo DFE/OCTm OU, poss Avastin OD      I saw and examined the patient and reviewed in detail the findings documented. The final examination findings, image interpretations, and plan as documented in the record represent my personal judgment and conclusions.    Yo Lee MD  Vitreoretinal Surgery   [facility]

## 2023-11-21 ENCOUNTER — PROCEDURE VISIT (OUTPATIENT)
Dept: OPHTHALMOLOGY | Facility: CLINIC | Age: 48
End: 2023-11-21
Payer: MEDICAID

## 2023-11-21 DIAGNOSIS — D57.1 PROLIFERATIVE RETINOPATHY DUE TO SICKLE CELL DISEASE: Primary | ICD-10-CM

## 2023-11-21 DIAGNOSIS — H25.13 AGE-RELATED NUCLEAR CATARACT OF BOTH EYES: ICD-10-CM

## 2023-11-21 DIAGNOSIS — H36.829 PROLIFERATIVE RETINOPATHY DUE TO SICKLE CELL DISEASE: Primary | ICD-10-CM

## 2023-11-21 DIAGNOSIS — H43.13 VITREOUS HEMORRHAGE, BILATERAL: ICD-10-CM

## 2023-11-21 PROCEDURE — 92134 OCT, RETINA - OU - BOTH EYES: ICD-10-PCS | Mod: 26,S$PBB,, | Performed by: OPHTHALMOLOGY

## 2023-11-21 PROCEDURE — 67028 PR INJECT INTRAVITREAL PHARMCOLOGIC: ICD-10-PCS | Mod: S$PBB,RT,, | Performed by: OPHTHALMOLOGY

## 2023-11-21 PROCEDURE — 99214 OFFICE O/P EST MOD 30 MIN: CPT | Mod: 25,S$PBB,, | Performed by: OPHTHALMOLOGY

## 2023-11-21 PROCEDURE — 67028 INJECTION EYE DRUG: CPT | Mod: S$PBB,RT,, | Performed by: OPHTHALMOLOGY

## 2023-11-21 PROCEDURE — 92134 CPTRZ OPH DX IMG PST SGM RTA: CPT | Mod: PBBFAC | Performed by: OPHTHALMOLOGY

## 2023-11-21 PROCEDURE — 99214 PR OFFICE/OUTPT VISIT, EST, LEVL IV, 30-39 MIN: ICD-10-PCS | Mod: 25,S$PBB,, | Performed by: OPHTHALMOLOGY

## 2023-11-21 PROCEDURE — 67028 INJECTION EYE DRUG: CPT | Mod: PBBFAC,RT | Performed by: OPHTHALMOLOGY

## 2023-11-21 RX ADMIN — Medication 1.25 MG: at 11:11

## 2023-12-06 ENCOUNTER — OFFICE VISIT (OUTPATIENT)
Dept: OBSTETRICS AND GYNECOLOGY | Facility: CLINIC | Age: 48
End: 2023-12-06
Payer: MEDICAID

## 2023-12-06 VITALS — BODY MASS INDEX: 33.6 KG/M2 | HEIGHT: 63 IN | WEIGHT: 189.63 LBS

## 2023-12-06 DIAGNOSIS — Z12.31 BREAST CANCER SCREENING BY MAMMOGRAM: Primary | ICD-10-CM

## 2023-12-06 DIAGNOSIS — Z11.3 SCREENING FOR STD (SEXUALLY TRANSMITTED DISEASE): ICD-10-CM

## 2023-12-06 DIAGNOSIS — Z01.419 WELL WOMAN EXAM WITH ROUTINE GYNECOLOGICAL EXAM: ICD-10-CM

## 2023-12-06 PROCEDURE — 87591 N.GONORRHOEAE DNA AMP PROB: CPT | Performed by: OBSTETRICS & GYNECOLOGY

## 2023-12-06 PROCEDURE — 99386 PR PREVENTIVE VISIT,NEW,40-64: ICD-10-PCS | Mod: S$PBB,,, | Performed by: OBSTETRICS & GYNECOLOGY

## 2023-12-06 PROCEDURE — 99213 OFFICE O/P EST LOW 20 MIN: CPT | Mod: PBBFAC,PN | Performed by: OBSTETRICS & GYNECOLOGY

## 2023-12-06 PROCEDURE — 99386 PREV VISIT NEW AGE 40-64: CPT | Mod: S$PBB,,, | Performed by: OBSTETRICS & GYNECOLOGY

## 2023-12-06 PROCEDURE — 99999 PR PBB SHADOW E&M-EST. PATIENT-LVL III: CPT | Mod: PBBFAC,,, | Performed by: OBSTETRICS & GYNECOLOGY

## 2023-12-06 PROCEDURE — 99999 PR PBB SHADOW E&M-EST. PATIENT-LVL III: ICD-10-PCS | Mod: PBBFAC,,, | Performed by: OBSTETRICS & GYNECOLOGY

## 2023-12-06 PROCEDURE — 1159F MED LIST DOCD IN RCRD: CPT | Mod: CPTII,,, | Performed by: OBSTETRICS & GYNECOLOGY

## 2023-12-06 PROCEDURE — 3008F PR BODY MASS INDEX (BMI) DOCUMENTED: ICD-10-PCS | Mod: CPTII,,, | Performed by: OBSTETRICS & GYNECOLOGY

## 2023-12-06 PROCEDURE — 3008F BODY MASS INDEX DOCD: CPT | Mod: CPTII,,, | Performed by: OBSTETRICS & GYNECOLOGY

## 2023-12-06 PROCEDURE — 1159F PR MEDICATION LIST DOCUMENTED IN MEDICAL RECORD: ICD-10-PCS | Mod: CPTII,,, | Performed by: OBSTETRICS & GYNECOLOGY
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
## 2023-12-07 LAB
C TRACH DNA SPEC QL NAA+PROBE: NOT DETECTED
N GONORRHOEA DNA SPEC QL NAA+PROBE: NOT DETECTED

## 2023-12-19 ENCOUNTER — OFFICE VISIT (OUTPATIENT)
Dept: OPHTHALMOLOGY | Facility: CLINIC | Age: 48
End: 2023-12-19
Payer: MEDICAID

## 2023-12-19 DIAGNOSIS — H43.13 VITREOUS HEMORRHAGE, BILATERAL: ICD-10-CM

## 2023-12-19 DIAGNOSIS — H36.829 PROLIFERATIVE RETINOPATHY DUE TO SICKLE CELL DISEASE: Primary | ICD-10-CM

## 2023-12-19 DIAGNOSIS — D57.1 PROLIFERATIVE RETINOPATHY DUE TO SICKLE CELL DISEASE: Primary | ICD-10-CM

## 2023-12-19 DIAGNOSIS — H25.13 AGE-RELATED NUCLEAR CATARACT OF BOTH EYES: ICD-10-CM

## 2023-12-19 PROCEDURE — 1160F PR REVIEW ALL MEDS BY PRESCRIBER/CLIN PHARMACIST DOCUMENTED: ICD-10-PCS | Mod: CPTII,,, | Performed by: OPHTHALMOLOGY

## 2023-12-19 PROCEDURE — 99999 PR PBB SHADOW E&M-EST. PATIENT-LVL II: CPT | Mod: PBBFAC,,, | Performed by: OPHTHALMOLOGY

## 2023-12-19 PROCEDURE — 92014 PR EYE EXAM, EST PATIENT,COMPREHESV: ICD-10-PCS | Mod: S$PBB,,, | Performed by: OPHTHALMOLOGY

## 2023-12-19 PROCEDURE — 1160F RVW MEDS BY RX/DR IN RCRD: CPT | Mod: CPTII,,, | Performed by: OPHTHALMOLOGY

## 2023-12-19 PROCEDURE — 99999 PR PBB SHADOW E&M-EST. PATIENT-LVL II: ICD-10-PCS | Mod: PBBFAC,,, | Performed by: OPHTHALMOLOGY

## 2023-12-19 PROCEDURE — 92134 OCT, RETINA - OU - BOTH EYES: ICD-10-PCS | Mod: 26,S$PBB,, | Performed by: OPHTHALMOLOGY

## 2023-12-19 PROCEDURE — 1159F PR MEDICATION LIST DOCUMENTED IN MEDICAL RECORD: ICD-10-PCS | Mod: CPTII,,, | Performed by: OPHTHALMOLOGY

## 2023-12-19 PROCEDURE — 99212 OFFICE O/P EST SF 10 MIN: CPT | Mod: PBBFAC | Performed by: OPHTHALMOLOGY

## 2023-12-19 PROCEDURE — 92014 COMPRE OPH EXAM EST PT 1/>: CPT | Mod: S$PBB,,, | Performed by: OPHTHALMOLOGY

## 2023-12-19 PROCEDURE — 92134 CPTRZ OPH DX IMG PST SGM RTA: CPT | Mod: PBBFAC | Performed by: OPHTHALMOLOGY

## 2023-12-19 PROCEDURE — 1159F MED LIST DOCD IN RCRD: CPT | Mod: CPTII,,, | Performed by: OPHTHALMOLOGY

## 2023-12-19 RX ORDER — CETIRIZINE HYDROCHLORIDE 10 MG/1
10 TABLET ORAL DAILY PRN
COMMUNITY
Start: 2023-12-15

## 2023-12-19 RX ORDER — FLUTICASONE PROPIONATE 50 MCG
2 SPRAY, SUSPENSION (ML) NASAL DAILY PRN
COMMUNITY
Start: 2023-12-15

## 2023-12-19 NOTE — PROGRESS NOTES
HPI    1m OCT/DFE/poss PAMELA OD    Pt states va is improved OD since last visit, improved floaters OD. Stable   va/floaters OS. No new symptoms or concerns today.    No flashes  Floaters OU  No pain  Gtts: AT's PRN OU    POHx:   1. MILD NS  OU   2. NVS OU     Last edited by Maryann Engle on 12/19/2023 10:24 AM.     HPI    1m OCT/DFE/poss PAMELA OD    Pt states va is improved OD since last visit, improved floaters OD. Stable   va/floaters OS. No new symptoms or concerns today.    No flashes  Floaters OU  No pain  Gtts: AT's PRN OU    POHx:   1. MILD NS  OU   2. NVS OU     Last edited by Maryann Engle on 12/19/2023 10:24 AM.          A/P    ICD-10-CM ICD-9-CM   1. 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 Vitreous hemorrhage, bilateral  H43.13 379.23   3. Age-related nuclear cataract of both eyes  H25.13 366.16         1. 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 Vitreous hemorrhage, bilateral    Has sickle trait    OD: s/p YUKI 11/21/23  Pre-Op VA HM stable, older heme now but still dense throughout, B scan retina flat    given persistent hemorrhage, needs vitrectomy, planning for 6/12/23 under mac/block    Postop: s/p PPV/EL/AFx (Date 6/12/23 by Rosa/Brett) for [facility] OD     Today 12/19/2023  VA 20/25 (Was 20/50), resolved heme, attached    Plan:  resolved heme after last avastin, monitor       OS: s/p PRP 5/9/23  VA 20/20 (stable), peripheral regressed NV, old heme inferiorly, peripheral laser   Plan: no active NV, observe no injection         3. Age-related nuclear cataract of both eyes  Mild NS, NVS  Plan: Observation      RTC 6 mo DFE/OCTm OU, poss Avastin OD      I saw and examined the patient and reviewed in detail the findings documented. The final examination findings, image interpretations, and plan as documented in the record represent my personal judgment and conclusions.    Yo Lee MD  Vitreoretinal Surgery   [facility]

## 2024-01-11 ENCOUNTER — PATIENT MESSAGE (OUTPATIENT)
Dept: ADMINISTRATIVE | Facility: HOSPITAL | Age: 49
End: 2024-01-11
Payer: MEDICAID

## 2024-01-11 ENCOUNTER — PATIENT OUTREACH (OUTPATIENT)
Dept: ADMINISTRATIVE | Facility: HOSPITAL | Age: 49
End: 2024-01-11
Payer: MEDICAID

## 2024-01-11 NOTE — PROGRESS NOTES

## 2024-01-18 ENCOUNTER — PATIENT OUTREACH (OUTPATIENT)
Dept: ADMINISTRATIVE | Facility: HOSPITAL | Age: 49
End: 2024-01-18
Payer: MEDICAID

## 2024-02-20 ENCOUNTER — PATIENT OUTREACH (OUTPATIENT)
Dept: ADMINISTRATIVE | Facility: HOSPITAL | Age: 49
End: 2024-02-20
Payer: MEDICAID

## 2024-04-14 ENCOUNTER — HOSPITAL ENCOUNTER (EMERGENCY)
Facility: OTHER | Age: 49
Discharge: HOME | End: 2024-04-14
Attending: EMERGENCY MEDICINE
Payer: MEDICAID

## 2024-04-14 VITALS
BODY MASS INDEX: 33.66 KG/M2 | RESPIRATION RATE: 18 BRPM | SYSTOLIC BLOOD PRESSURE: 130 MMHG | OXYGEN SATURATION: 98 % | WEIGHT: 190 LBS | DIASTOLIC BLOOD PRESSURE: 60 MMHG | HEART RATE: 60 BPM | HEIGHT: 63 IN | TEMPERATURE: 98 F

## 2024-04-14 DIAGNOSIS — M62.838 MUSCLE SPASM OF RIGHT LEG: Primary | ICD-10-CM

## 2024-04-14 PROCEDURE — 99284 EMERGENCY DEPT VISIT MOD MDM: CPT | Mod: 25

## 2024-04-14 PROCEDURE — 25000003 PHARM REV CODE 250: Performed by: EMERGENCY MEDICINE

## 2024-04-14 PROCEDURE — 63600175 PHARM REV CODE 636 W HCPCS: Performed by: EMERGENCY MEDICINE

## 2024-04-14 PROCEDURE — 96372 THER/PROPH/DIAG INJ SC/IM: CPT | Mod: 59 | Performed by: EMERGENCY MEDICINE

## 2024-04-14 PROCEDURE — 96374 THER/PROPH/DIAG INJ IV PUSH: CPT

## 2024-04-14 RX ORDER — LIDOCAINE 50 MG/G
1 PATCH TOPICAL DAILY
Qty: 7 PATCH | Refills: 0 | Status: SHIPPED | OUTPATIENT
Start: 2024-04-14 | End: 2024-04-21

## 2024-04-14 RX ORDER — KETOROLAC TROMETHAMINE 30 MG/ML
15 INJECTION, SOLUTION INTRAMUSCULAR; INTRAVENOUS
Status: COMPLETED | OUTPATIENT
Start: 2024-04-14 | End: 2024-04-14

## 2024-04-14 RX ORDER — LIDOCAINE 50 MG/G
1 PATCH TOPICAL
Status: DISCONTINUED | OUTPATIENT
Start: 2024-04-14 | End: 2024-04-14 | Stop reason: HOSPADM

## 2024-04-14 RX ORDER — LIDOCAINE 50 MG/G
1 PATCH TOPICAL
Status: DISCONTINUED | OUTPATIENT
Start: 2024-04-14 | End: 2024-04-14

## 2024-04-14 RX ORDER — OXYCODONE AND ACETAMINOPHEN 5; 325 MG/1; MG/1
1 TABLET ORAL
Status: COMPLETED | OUTPATIENT
Start: 2024-04-14 | End: 2024-04-14

## 2024-04-14 RX ORDER — DIAZEPAM 5 MG/1
5 TABLET ORAL
Status: COMPLETED | OUTPATIENT
Start: 2024-04-14 | End: 2024-04-14

## 2024-04-14 RX ORDER — OXYCODONE AND ACETAMINOPHEN 5; 325 MG/1; MG/1
1 TABLET ORAL EVERY 12 HOURS PRN
Qty: 6 TABLET | Refills: 0 | Status: SHIPPED | OUTPATIENT
Start: 2024-04-14 | End: 2024-04-17

## 2024-04-14 RX ORDER — HYDROMORPHONE HYDROCHLORIDE 1 MG/ML
0.5 INJECTION, SOLUTION INTRAMUSCULAR; INTRAVENOUS; SUBCUTANEOUS
Status: COMPLETED | OUTPATIENT
Start: 2024-04-14 | End: 2024-04-14

## 2024-04-14 RX ORDER — IBUPROFEN 600 MG/1
600 TABLET ORAL EVERY 8 HOURS PRN
Qty: 20 TABLET | Refills: 0 | Status: SHIPPED | OUTPATIENT
Start: 2024-04-14 | End: 2024-04-21

## 2024-04-14 RX ADMIN — KETOROLAC TROMETHAMINE 15 MG: 30 INJECTION, SOLUTION INTRAMUSCULAR; INTRAVENOUS at 04:04

## 2024-04-14 RX ADMIN — HYDROMORPHONE HYDROCHLORIDE 0.5 MG: 0.5 INJECTION, SOLUTION INTRAMUSCULAR; INTRAVENOUS; SUBCUTANEOUS at 06:04

## 2024-04-14 RX ADMIN — OXYCODONE HYDROCHLORIDE AND ACETAMINOPHEN 1 TABLET: 5; 325 TABLET ORAL at 05:04

## 2024-04-14 RX ADMIN — LIDOCAINE 1 PATCH: 50 PATCH CUTANEOUS at 05:04

## 2024-04-14 RX ADMIN — DIAZEPAM 5 MG: 5 TABLET ORAL at 04:04

## 2024-04-14 NOTE — ED PROVIDER NOTES
Encounter Date: 4/14/2024       History     Chief Complaint   Patient presents with    Leg Pain     Patient brought to ER by NO EMS , patient states two days ago pain started in my right toe and it has progressively got worse, and new episode started at 1300 yeasterday the pain in my right leg , right knee and radiates to my right arm and shoulder, pain on scale 10 constant , EMS gave patient 25 of fentanyl prior to arrival to ER patient [id number] , able to move all 4 extremities patient reports she is unable to bear weight on right leg     49-year-old female with history of hypertension, sickle cell trait, and bipolar disorder presents via EMS complaining of right leg pain for over 12 hours.  The patient states that she was walking today when she developed pain in her right knee that radiates up her thigh all the way to her right shoulder/axillary region.  She describes the pain as a sharp sensation that is aggravated by straightening her leg.  She reports having a recent upper respiratory illness for which she has been taking Tessalon Perles and Zyrtec.  She denies any recent falls, heavy lifting, slipped/trips.  She denies any similar pain in the past.  She does report taking Flexeril roughly 3 hours ago with no improvement.  She reports having a tingling sensation in her right toe several days ago but that has resolved.  She did receive fentanyl 25 mg IV EN route.      Review of patient's allergies indicates:   Allergen Reactions    Reglan [metoclopramide hcl] Hallucinations    Metoclopramide      Other reaction(s): Other (See Comments)     Past Medical History:   Diagnosis Date    Age-related nuclear cataract of both eyes 4/25/2023    Bipolar 1 disorder     Hypertension     Iron deficiency anemia 6/4/2018    Sickle cell trait      Past Surgical History:   Procedure Laterality Date    ABLATION OF ENDOMETRIUM USING RESECTOSCOPE  9/10/2018    CHOLECYSTECTOMY      DILATION AND CURETTAGE OF UTERUS  9/10/2018     HYSTEROSCOPY WITH DILATION AND CURETTAGE OF UTERUS N/A 9/10/2018    TUBAL LIGATION      VITRECTOMY BY PARS PLANA APPROACH Right 6/12/2023    Procedure: VITRECTOMY, PARS PLANA APPROACH;  Surgeon: Yo Lee MD;  Location: [facility] OR [address] [place] [address];  Service: Ophthalmology;  Laterality: Right;  Mac block     Family History   Problem Relation Name Age of Onset    Hypertension Mother      Heart failure Father      Hypertension Father      Ulcers Father      Hypertension Sister      ADD / ADHD Daughter      ADD / ADHD Son      No Known Problems Brother      No Known Problems Maternal Aunt      No Known Problems Maternal Uncle      No Known Problems Paternal Aunt      No Known Problems Paternal Uncle      No Known Problems Maternal Grandmother      No Known Problems Maternal Grandfather      No Known Problems Paternal Grandmother      No Known Problems Paternal Grandfather      Breast cancer Neg Hx      Colon cancer Neg Hx      Ovarian cancer Neg Hx      Amblyopia Neg Hx      Blindness Neg Hx      Cancer Neg Hx      Cataracts Neg Hx      Diabetes Neg Hx      Glaucoma Neg Hx      Macular degeneration Neg Hx      Retinal detachment Neg Hx      Strabismus Neg Hx      Stroke Neg Hx      Thyroid disease Neg Hx       Social History     Tobacco Use    Smoking status: Never    Smokeless tobacco: Never   Substance Use Topics    Alcohol use: Yes     Comment: occasionally    Drug use: No     Review of Systems    Physical Exam     Initial Vitals [04/14/24 0400]   BP Pulse Resp Temp SpO2   110/76 (!) 57 18 98.4 °F (36.9 °C) 100 %      MAP       --         Physical Exam    Nursing note and vitals reviewed.  Constitutional: She appears well-developed and well-nourished. She is not diaphoretic. No distress.   Appears uncomfortable   HENT:   Head: Normocephalic and atraumatic.   Right Ear: External ear normal.   Left Ear: External ear normal.   Nose: Nose normal.   Eyes: Conjunctivae and EOM are normal. Right eye exhibits no discharge. Left eye  exhibits no discharge.   Neck: Neck supple.   Normal range of motion.  Cardiovascular:  Normal rate, regular rhythm, normal heart sounds and intact distal pulses.     Exam reveals no friction rub.       No murmur heard.  Pulmonary/Chest: Breath sounds normal. No respiratory distress. She has no wheezes. She has no rhonchi. She has no rales. She exhibits no tenderness.   Musculoskeletal:      Cervical back: Normal range of motion and neck supple.      Comments: Full range of motion of bilateral upper and lower extremities.  Neurovascularly intact.  Compartments of the legs are soft with no edema or skin discoloration.  No focal bony tenderness to palpation of the knee or hip.     Neurological: She is alert and oriented to person, place, and time. She has normal strength. No sensory deficit.   Skin: Skin is warm.         ED Course   Procedures  Labs Reviewed - No data to display       Imaging Results    None          Medications   oxyCODONE-acetaminophen 5-325 mg per tablet 1 tablet (has no administration in time range)   LIDOcaine 5 % patch 1 patch (has no administration in time range)   diazePAM tablet 5 mg (5 mg Oral Given 4/14/24 0411)   ketorolac injection 15 mg (15 mg Intravenous Given 4/14/24 0411)     Medical Decision Making  49-year-old female presents complaining of atraumatic right leg pain which originated around the knee region and radiates up to her right shoulder.  The pain is aggravated by extension of the right leg.  Despite this she was able to fully extend at the hip and knee.  Given the absence of any trauma and her exam, I do not suspect a fracture and/or dislocation.  I also do not suspect a DVT.  Presentation most consistent with a muscle spasm.  Will give Toradol and Valium and reassess.    0441  Patient was sleeping comfortably on her right side.  Despite this, when awoken she reported no improvement in her pain.  Will continue to monitor and reassess.    0520  Patient awake and now able to  "straighten her leg.  She states the "pain came back".  She now states that the pain did resolve at some point but now is in her inner thigh.  Will give dose of Percocet and apply lidocaine patch.  She already has a prescription for muscle relaxer at home.  Will discharge at this time with prescriptions for lidocaine patch and Percocet x2 days.  PCP follow-up for re-evaluation if symptoms persist despite these interventions.    Risk  Prescription drug management.                                      Clinical Impression:  Final diagnoses:  [M62.838] Muscle spasm of right leg (Primary)          ED Disposition Condition    Discharge Stable          ED Prescriptions       Medication Sig Dispense Start Date End Date Auth. Provider    oxyCODONE-acetaminophen (PERCOCET) 5-325 mg per tablet Take 1 tablet by mouth every 12 (twelve) hours as needed for Pain (severe pain). 6 tablet 4/14/2024 4/17/2024 Nany Sousa MD    LIDOcaine (LIDODERM) 5 % Place 1 patch onto the skin once daily. Remove & Discard patch within 12 hours or as directed by MD for 7 days 7 patch 4/14/2024 4/21/2024 Nany Sousa MD    ibuprofen (ADVIL,MOTRIN) 600 MG tablet Take 1 tablet (600 mg total) by mouth every 8 (eight) hours as needed for Pain. 20 tablet 4/14/2024 4/21/2024 Nany Sousa MD          Follow-up Information       Follow up With Specialties Details Why Contact Info    Radha Rocha MD Internal Medicine Schedule an appointment as soon as possible for a visit   [address] [facility] [address]  [phone]               Nany Sousa MD  04/14/24 0597    "

## 2024-04-14 NOTE — DISCHARGE INSTRUCTIONS
Take medications as prescribed.  Also take your muscle relaxer as previously prescribed.  Follow up with your primary care doctor if pain continues despite taking these medications.

## 2024-05-15 ENCOUNTER — TELEPHONE (OUTPATIENT)
Dept: OPHTHALMOLOGY | Facility: CLINIC | Age: 49
End: 2024-05-15
Payer: MEDICAID

## 2024-05-15 NOTE — TELEPHONE ENCOUNTER
----- Message from Daryl Smith sent at 5/15/2024 12:23 PM CDT -----  Contact: [phone] Irving CANO is calling to see if the pt can be seen today for sudden vision loss. Please call back to further assist.

## 2024-05-16 ENCOUNTER — OFFICE VISIT (OUTPATIENT)
Dept: OPHTHALMOLOGY | Facility: CLINIC | Age: 49
End: 2024-05-16
Payer: MEDICAID

## 2024-05-16 DIAGNOSIS — D57.1 PROLIFERATIVE RETINOPATHY DUE TO SICKLE CELL DISEASE: Primary | ICD-10-CM

## 2024-05-16 DIAGNOSIS — H25.13 AGE-RELATED NUCLEAR CATARACT OF BOTH EYES: ICD-10-CM

## 2024-05-16 DIAGNOSIS — H36.829 PROLIFERATIVE RETINOPATHY DUE TO SICKLE CELL DISEASE: Primary | ICD-10-CM

## 2024-05-16 DIAGNOSIS — H43.13 VITREOUS HEMORRHAGE, BILATERAL: ICD-10-CM

## 2024-05-16 PROCEDURE — 99212 OFFICE O/P EST SF 10 MIN: CPT | Mod: PBBFAC,25 | Performed by: OPHTHALMOLOGY

## 2024-05-16 PROCEDURE — 99214 OFFICE O/P EST MOD 30 MIN: CPT | Mod: 25,S$PBB,, | Performed by: OPHTHALMOLOGY

## 2024-05-16 PROCEDURE — 67028 INJECTION EYE DRUG: CPT | Mod: S$PBB,RT,, | Performed by: OPHTHALMOLOGY

## 2024-05-16 PROCEDURE — 67028 INJECTION EYE DRUG: CPT | Mod: PBBFAC,RT | Performed by: OPHTHALMOLOGY

## 2024-05-16 PROCEDURE — 99999PBSHW PR PBB SHADOW TECHNICAL ONLY FILED TO HB: Mod: JZ,PBBFAC,,

## 2024-05-16 PROCEDURE — 1160F RVW MEDS BY RX/DR IN RCRD: CPT | Mod: CPTII,,, | Performed by: OPHTHALMOLOGY

## 2024-05-16 PROCEDURE — 76512 OPH US DX B-SCAN: CPT | Mod: 50,PBBFAC | Performed by: OPHTHALMOLOGY

## 2024-05-16 PROCEDURE — 92134 CPTRZ OPH DX IMG PST SGM RTA: CPT | Mod: PBBFAC | Performed by: OPHTHALMOLOGY

## 2024-05-16 PROCEDURE — 1159F MED LIST DOCD IN RCRD: CPT | Mod: CPTII,,, | Performed by: OPHTHALMOLOGY

## 2024-05-16 PROCEDURE — 99999 PR PBB SHADOW E&M-EST. PATIENT-LVL II: CPT | Mod: PBBFAC,,, | Performed by: OPHTHALMOLOGY

## 2024-05-16 RX ADMIN — Medication 1.25 MG: at 11:05
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
## (undated) DEVICE — SYR DISP LL 5CC

## (undated) DEVICE — SOL BSS BALANCED SALT

## (undated) DEVICE — TRAY MUSCLE LID EYE

## (undated) DEVICE — NDL 22GA X1 1/2 REG BEVEL

## (undated) DEVICE — PROBE ILLUM FLEX CURVE LASER

## (undated) DEVICE — KIT PERFLUOROCARBON LIQUID

## (undated) DEVICE — GLOVE BIOGEL ECLIPSE SZ 6.5

## (undated) DEVICE — UNDERGLOVE BIOGEL PI SZ 6.5 LF

## (undated) DEVICE — GOWN SURGICAL X-LARGE

## (undated) DEVICE — SOL BALANCED SALT 500ML

## (undated) DEVICE — BACKFLUSH 25GA SOFT-TIP DISP

## (undated) DEVICE — CORD FOR BIPOLAR FORCEPS 12

## (undated) DEVICE — SEE MEDLINE ITEM 146313

## (undated) DEVICE — NEEDLE HYPODERMIC HUB LUER LOC

## (undated) DEVICE — COVER MAYO STAND REINFRCD 30

## (undated) DEVICE — GLOVE BIOGEL SKINSENSE PI 6.0

## (undated) DEVICE — SYR 10CC LUER LOCK

## (undated) DEVICE — COVER PROXIMA MAYO STAND

## (undated) DEVICE — NDL HYPO A BEVEL 30X1/2

## (undated) DEVICE — STRIP MEDI WND CLSR 1/2X4IN

## (undated) DEVICE — KNIFE OPHTH MICRO UNITOME 5MM

## (undated) DEVICE — DRAPE THREE-QTR REINF 53X77IN

## (undated) DEVICE — SOL GONAK

## (undated) DEVICE — PACK INSTRUMENT COVER DISPO

## (undated) DEVICE — HOLDER TUBE

## (undated) DEVICE — SUT 7/0 18IN COATED VICRYL

## (undated) DEVICE — PACK TOTAL PLUS 25G VITRECTOMY

## (undated) DEVICE — DEVICE MYOSURE XL TISS REMOVAL

## (undated) DEVICE — SET BASIN 48X48IN 6000ML RING

## (undated) DEVICE — SYRINGE 30CC LL W/O NDL

## (undated) DEVICE — SET INFLOW TUBE HYSTER

## (undated) DEVICE — DRESSING EYE OVAL LF

## (undated) DEVICE — SHIELD EYE METAL FOX 50/BX

## (undated) DEVICE — CONTAINER SPECIMEN STRL 4OZ

## (undated) DEVICE — LENS VITRCTMY OPHTH 30DEG 59DE

## (undated) DEVICE — SOL BETADINE 5%

## (undated) DEVICE — FORCEP GRIESHABER MAXGRIP 25G

## (undated) DEVICE — SOL 9P NACL IRR PIC IL

## (undated) DEVICE — SOL WATER STRL IRR 1000ML

## (undated) DEVICE — SYR 1CC TB SG 27GX1/2

## (undated) DEVICE — FORCEP GRASPING 25GA SMOOTH

## (undated) DEVICE — KIT GREY EYE

## (undated) DEVICE — Device

## (undated) DEVICE — SOL IRR NACL .9% 3000ML